# Patient Record
Sex: MALE | Race: WHITE | NOT HISPANIC OR LATINO | URBAN - METROPOLITAN AREA
[De-identification: names, ages, dates, MRNs, and addresses within clinical notes are randomized per-mention and may not be internally consistent; named-entity substitution may affect disease eponyms.]

---

## 2022-09-26 ENCOUNTER — HOSPITAL ENCOUNTER (EMERGENCY)
Facility: HOSPITAL | Age: 77
Discharge: HOME | End: 2022-09-26
Attending: EMERGENCY MEDICINE
Payer: MEDICARE

## 2022-09-26 VITALS
DIASTOLIC BLOOD PRESSURE: 81 MMHG | RESPIRATION RATE: 16 BRPM | WEIGHT: 180 LBS | HEART RATE: 94 BPM | OXYGEN SATURATION: 98 % | TEMPERATURE: 98 F | SYSTOLIC BLOOD PRESSURE: 165 MMHG
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
## 2022-09-26 DIAGNOSIS — S09.90XA CLOSED HEAD INJURY, INITIAL ENCOUNTER: Primary | ICD-10-CM

## 2022-09-26 PROCEDURE — 99284 EMERGENCY DEPT VISIT MOD MDM: CPT | Mod: 25

## 2022-09-27 NOTE — ED NOTES
Pt presents with EMS for fall at restaurant.  +ETOH, no c/o at this time.      Patient identifiers verified by spelling and stated name on armband along with .     Review of patient's allergies indicates:  Review of patient's allergies indicates:  No Known Allergies    APPEARANCE: . No apparent distress or deformities noted.  NEURO: A&Ox4, GCS-15.     CARDIAC: Denies CP.   RESPIRATORY:Respirations are even and unlabored with regular rate and effort, No use of accessory muscles, denies SOB.   MUSC: Full ROM. No bony tenderness or soft tissue tenderness. No obvious deformity.  SKIN: abrasion to back of scalp    Patient updated on plan of care and changed into hospital gown.  Bed locked and in low position with side rails up x2, call light within reach, family at bedside.     Will continue to monitor.
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
## 2022-12-27 ENCOUNTER — HOSPITAL ENCOUNTER (INPATIENT)
Facility: HOSPITAL | Age: 77
LOS: 8 days | Discharge: PSYCHIATRIC OR SUBSTANCE USE FACILITY | DRG: 056 | End: 2023-01-04
Attending: EMERGENCY MEDICINE | Admitting: INTERNAL MEDICINE
Payer: MEDICARE

## 2022-12-27 DIAGNOSIS — R79.89 ELEVATED TROPONIN: ICD-10-CM

## 2022-12-27 DIAGNOSIS — F03.90 DEMENTIA, UNSPECIFIED DEMENTIA SEVERITY, UNSPECIFIED DEMENTIA TYPE, UNSPECIFIED WHETHER BEHAVIORAL, PSYCHOTIC, OR MOOD DISTURBANCE OR ANXIETY: Primary | ICD-10-CM

## 2022-12-27 DIAGNOSIS — N40.1 BENIGN PROSTATIC HYPERPLASIA WITH LOWER URINARY TRACT SYMPTOMS, SYMPTOM DETAILS UNSPECIFIED: ICD-10-CM

## 2022-12-27 DIAGNOSIS — R07.9 CHEST PAIN: ICD-10-CM

## 2022-12-27 DIAGNOSIS — E53.8 B12 DEFICIENCY: ICD-10-CM

## 2022-12-27 DIAGNOSIS — E80.6 HYPERBILIRUBINEMIA: ICD-10-CM

## 2022-12-27 DIAGNOSIS — G93.40 ACUTE ENCEPHALOPATHY: ICD-10-CM

## 2022-12-27 DIAGNOSIS — F01.B11 MODERATE VASCULAR DEMENTIA WITH AGITATION: ICD-10-CM

## 2022-12-27 DIAGNOSIS — R33.9 RETENTION OF URINE: ICD-10-CM

## 2022-12-27 DIAGNOSIS — D64.9 NORMOCYTIC ANEMIA: ICD-10-CM

## 2022-12-27 DIAGNOSIS — R10.11 RUQ PAIN: ICD-10-CM

## 2022-12-27 DIAGNOSIS — I21.4 NSTEMI (NON-ST ELEVATED MYOCARDIAL INFARCTION): ICD-10-CM

## 2022-12-27 DIAGNOSIS — F01.A11 MILD VASCULAR DEMENTIA WITH AGITATION: ICD-10-CM

## 2022-12-27 DIAGNOSIS — R73.9 HYPERGLYCEMIA: ICD-10-CM

## 2022-12-27 DIAGNOSIS — F10.10 ALCOHOL ABUSE: ICD-10-CM

## 2022-12-27 DIAGNOSIS — E78.5 HYPERLIPIDEMIA, UNSPECIFIED HYPERLIPIDEMIA TYPE: ICD-10-CM

## 2022-12-27 PROBLEM — U07.1 COVID-19: Status: ACTIVE | Noted: 2022-12-27

## 2022-12-27 PROBLEM — I10 HYPERTENSION: Status: ACTIVE | Noted: 2022-12-27

## 2022-12-27 PROBLEM — I35.0 AORTIC VALVE STENOSIS: Status: ACTIVE | Noted: 2022-12-27

## 2022-12-27 PROBLEM — E11.9 DIABETES MELLITUS: Status: ACTIVE | Noted: 2022-12-27

## 2022-12-27 PROBLEM — U07.1 COVID-19: Status: RESOLVED | Noted: 2022-12-27 | Resolved: 2022-12-27

## 2022-12-27 PROBLEM — N40.0 BENIGN PROSTATIC HYPERPLASIA: Status: ACTIVE | Noted: 2022-12-27

## 2022-12-27 LAB
ALBUMIN SERPL BCP-MCNC: 3.8 G/DL (ref 3.5–5.2)
ALBUMIN SERPL BCP-MCNC: 3.8 G/DL (ref 3.5–5.2)
ALP SERPL-CCNC: 47 U/L (ref 55–135)
ALP SERPL-CCNC: 54 U/L (ref 55–135)
ALT SERPL W/O P-5'-P-CCNC: 15 U/L (ref 10–44)
ALT SERPL W/O P-5'-P-CCNC: 15 U/L (ref 10–44)
AMMONIA PLAS-SCNC: 14 UMOL/L (ref 10–50)
AMMONIA PLAS-SCNC: 22 UMOL/L (ref 10–50)
ANION GAP SERPL CALC-SCNC: 11 MMOL/L (ref 8–16)
ANION GAP SERPL CALC-SCNC: 9 MMOL/L (ref 8–16)
AST SERPL-CCNC: 15 U/L (ref 10–40)
AST SERPL-CCNC: 17 U/L (ref 10–40)
BACTERIA #/AREA URNS HPF: ABNORMAL /HPF
BASOPHILS # BLD AUTO: 0.05 K/UL (ref 0–0.2)
BASOPHILS # BLD AUTO: 0.06 K/UL (ref 0–0.2)
BASOPHILS NFR BLD: 0.6 % (ref 0–1.9)
BASOPHILS NFR BLD: 1 % (ref 0–1.9)
BILIRUB SERPL-MCNC: 1 MG/DL (ref 0.1–1)
BILIRUB SERPL-MCNC: 1.1 MG/DL (ref 0.1–1)
BILIRUB UR QL STRIP: NEGATIVE
BUN SERPL-MCNC: 16 MG/DL (ref 8–23)
BUN SERPL-MCNC: 21 MG/DL (ref 8–23)
CALCIUM SERPL-MCNC: 9 MG/DL (ref 8.7–10.5)
CALCIUM SERPL-MCNC: 9.2 MG/DL (ref 8.7–10.5)
CHLORIDE SERPL-SCNC: 103 MMOL/L (ref 95–110)
CHLORIDE SERPL-SCNC: 106 MMOL/L (ref 95–110)
CLARITY UR: ABNORMAL
CO2 SERPL-SCNC: 23 MMOL/L (ref 23–29)
CO2 SERPL-SCNC: 24 MMOL/L (ref 23–29)
COLOR UR: YELLOW
CREAT SERPL-MCNC: 1 MG/DL (ref 0.5–1.4)
CREAT SERPL-MCNC: 1.2 MG/DL (ref 0.5–1.4)
DIFFERENTIAL METHOD: ABNORMAL
DIFFERENTIAL METHOD: ABNORMAL
EOSINOPHIL # BLD AUTO: 0.1 K/UL (ref 0–0.5)
EOSINOPHIL # BLD AUTO: 0.1 K/UL (ref 0–0.5)
EOSINOPHIL NFR BLD: 1 % (ref 0–8)
EOSINOPHIL NFR BLD: 1.5 % (ref 0–8)
ERYTHROCYTE [DISTWIDTH] IN BLOOD BY AUTOMATED COUNT: 13.2 % (ref 11.5–14.5)
ERYTHROCYTE [DISTWIDTH] IN BLOOD BY AUTOMATED COUNT: 13.2 % (ref 11.5–14.5)
EST. GFR  (NO RACE VARIABLE): >60 ML/MIN/1.73 M^2
EST. GFR  (NO RACE VARIABLE): >60 ML/MIN/1.73 M^2
ESTIMATED AVG GLUCOSE: 137 MG/DL (ref 68–131)
GLUCOSE SERPL-MCNC: 124 MG/DL (ref 70–110)
GLUCOSE SERPL-MCNC: 188 MG/DL (ref 70–110)
GLUCOSE UR QL STRIP: ABNORMAL
HBA1C MFR BLD: 6.4 % (ref 4–5.6)
HCT VFR BLD AUTO: 35.5 % (ref 40–54)
HCT VFR BLD AUTO: 36.5 % (ref 40–54)
HGB BLD-MCNC: 11.9 G/DL (ref 14–18)
HGB BLD-MCNC: 12.5 G/DL (ref 14–18)
HGB UR QL STRIP: ABNORMAL
HYALINE CASTS #/AREA URNS LPF: 1 /LPF
IMM GRANULOCYTES # BLD AUTO: 0.02 K/UL (ref 0–0.04)
IMM GRANULOCYTES # BLD AUTO: 0.02 K/UL (ref 0–0.04)
IMM GRANULOCYTES NFR BLD AUTO: 0.2 % (ref 0–0.5)
IMM GRANULOCYTES NFR BLD AUTO: 0.3 % (ref 0–0.5)
KETONES UR QL STRIP: NEGATIVE
LEUKOCYTE ESTERASE UR QL STRIP: ABNORMAL
LYMPHOCYTES # BLD AUTO: 1.3 K/UL (ref 1–4.8)
LYMPHOCYTES # BLD AUTO: 2.3 K/UL (ref 1–4.8)
LYMPHOCYTES NFR BLD: 20.9 % (ref 18–48)
LYMPHOCYTES NFR BLD: 27.6 % (ref 18–48)
MAGNESIUM SERPL-MCNC: 2.2 MG/DL (ref 1.6–2.6)
MCH RBC QN AUTO: 30.5 PG (ref 27–31)
MCH RBC QN AUTO: 30.7 PG (ref 27–31)
MCHC RBC AUTO-ENTMCNC: 33.5 G/DL (ref 32–36)
MCHC RBC AUTO-ENTMCNC: 34.2 G/DL (ref 32–36)
MCV RBC AUTO: 89 FL (ref 82–98)
MCV RBC AUTO: 92 FL (ref 82–98)
MICROSCOPIC COMMENT: ABNORMAL
MONOCYTES # BLD AUTO: 0.7 K/UL (ref 0.3–1)
MONOCYTES # BLD AUTO: 0.8 K/UL (ref 0.3–1)
MONOCYTES NFR BLD: 10 % (ref 4–15)
MONOCYTES NFR BLD: 11.3 % (ref 4–15)
NEUTROPHILS # BLD AUTO: 4 K/UL (ref 1.8–7.7)
NEUTROPHILS # BLD AUTO: 4.9 K/UL (ref 1.8–7.7)
NEUTROPHILS NFR BLD: 60.1 % (ref 38–73)
NEUTROPHILS NFR BLD: 65.5 % (ref 38–73)
NITRITE UR QL STRIP: NEGATIVE
NON-SQ EPI CELLS #/AREA URNS HPF: 2 /HPF
NRBC BLD-RTO: 0 /100 WBC
NRBC BLD-RTO: 0 /100 WBC
PH UR STRIP: 6 [PH] (ref 5–8)
PHOSPHATE SERPL-MCNC: 2.9 MG/DL (ref 2.7–4.5)
PLATELET # BLD AUTO: 158 K/UL (ref 150–450)
PLATELET # BLD AUTO: 163 K/UL (ref 150–450)
PMV BLD AUTO: 12.2 FL (ref 9.2–12.9)
PMV BLD AUTO: 12.5 FL (ref 9.2–12.9)
POTASSIUM SERPL-SCNC: 3.8 MMOL/L (ref 3.5–5.1)
POTASSIUM SERPL-SCNC: 4.2 MMOL/L (ref 3.5–5.1)
PROT SERPL-MCNC: 6.5 G/DL (ref 6–8.4)
PROT SERPL-MCNC: 6.9 G/DL (ref 6–8.4)
PROT UR QL STRIP: ABNORMAL
RBC # BLD AUTO: 3.87 M/UL (ref 4.6–6.2)
RBC # BLD AUTO: 4.1 M/UL (ref 4.6–6.2)
RBC #/AREA URNS HPF: 5 /HPF (ref 0–4)
SODIUM SERPL-SCNC: 136 MMOL/L (ref 136–145)
SODIUM SERPL-SCNC: 140 MMOL/L (ref 136–145)
SP GR UR STRIP: 1.01 (ref 1–1.03)
SQUAMOUS #/AREA URNS HPF: 16 /HPF
TROPONIN I SERPL DL<=0.01 NG/ML-MCNC: 0.16 NG/ML (ref 0–0.03)
TROPONIN I SERPL DL<=0.01 NG/ML-MCNC: 0.2 NG/ML (ref 0–0.03)
TSH SERPL DL<=0.005 MIU/L-ACNC: 3.18 UIU/ML (ref 0.4–4)
UNIDENT CRYS URNS QL MICRO: 2
URN SPEC COLLECT METH UR: ABNORMAL
UROBILINOGEN UR STRIP-ACNC: NEGATIVE EU/DL
WBC # BLD AUTO: 6.09 K/UL (ref 3.9–12.7)
WBC # BLD AUTO: 8.18 K/UL (ref 3.9–12.7)
WBC #/AREA URNS HPF: 73 /HPF (ref 0–5)
YEAST URNS QL MICRO: ABNORMAL

## 2022-12-27 PROCEDURE — 84443 ASSAY THYROID STIM HORMONE: CPT | Performed by: EMERGENCY MEDICINE

## 2022-12-27 PROCEDURE — 25000003 PHARM REV CODE 250: Performed by: STUDENT IN AN ORGANIZED HEALTH CARE EDUCATION/TRAINING PROGRAM

## 2022-12-27 PROCEDURE — 82140 ASSAY OF AMMONIA: CPT | Performed by: EMERGENCY MEDICINE

## 2022-12-27 PROCEDURE — 93010 ELECTROCARDIOGRAM REPORT: CPT | Mod: ,,, | Performed by: INTERNAL MEDICINE

## 2022-12-27 PROCEDURE — 83036 HEMOGLOBIN GLYCOSYLATED A1C: CPT | Performed by: STUDENT IN AN ORGANIZED HEALTH CARE EDUCATION/TRAINING PROGRAM

## 2022-12-27 PROCEDURE — 82140 ASSAY OF AMMONIA: CPT | Mod: 91 | Performed by: STUDENT IN AN ORGANIZED HEALTH CARE EDUCATION/TRAINING PROGRAM

## 2022-12-27 PROCEDURE — 87389 HIV-1 AG W/HIV-1&-2 AB AG IA: CPT | Performed by: STUDENT IN AN ORGANIZED HEALTH CARE EDUCATION/TRAINING PROGRAM

## 2022-12-27 PROCEDURE — 84484 ASSAY OF TROPONIN QUANT: CPT | Performed by: EMERGENCY MEDICINE

## 2022-12-27 PROCEDURE — 93010 ELECTROCARDIOGRAM REPORT: CPT | Mod: 76,,, | Performed by: INTERNAL MEDICINE

## 2022-12-27 PROCEDURE — 84425 ASSAY OF VITAMIN B-1: CPT | Performed by: STUDENT IN AN ORGANIZED HEALTH CARE EDUCATION/TRAINING PROGRAM

## 2022-12-27 PROCEDURE — 93005 ELECTROCARDIOGRAM TRACING: CPT

## 2022-12-27 PROCEDURE — 25000003 PHARM REV CODE 250: Performed by: EMERGENCY MEDICINE

## 2022-12-27 PROCEDURE — 85025 COMPLETE CBC W/AUTO DIFF WBC: CPT | Performed by: EMERGENCY MEDICINE

## 2022-12-27 PROCEDURE — S0166 INJ OLANZAPINE 2.5MG: HCPCS | Performed by: STUDENT IN AN ORGANIZED HEALTH CARE EDUCATION/TRAINING PROGRAM

## 2022-12-27 PROCEDURE — 83735 ASSAY OF MAGNESIUM: CPT | Performed by: STUDENT IN AN ORGANIZED HEALTH CARE EDUCATION/TRAINING PROGRAM

## 2022-12-27 PROCEDURE — 82607 VITAMIN B-12: CPT | Performed by: STUDENT IN AN ORGANIZED HEALTH CARE EDUCATION/TRAINING PROGRAM

## 2022-12-27 PROCEDURE — 84484 ASSAY OF TROPONIN QUANT: CPT | Mod: 91 | Performed by: STUDENT IN AN ORGANIZED HEALTH CARE EDUCATION/TRAINING PROGRAM

## 2022-12-27 PROCEDURE — 85025 COMPLETE CBC W/AUTO DIFF WBC: CPT | Mod: 91 | Performed by: STUDENT IN AN ORGANIZED HEALTH CARE EDUCATION/TRAINING PROGRAM

## 2022-12-27 PROCEDURE — 87086 URINE CULTURE/COLONY COUNT: CPT | Performed by: EMERGENCY MEDICINE

## 2022-12-27 PROCEDURE — 12000002 HC ACUTE/MED SURGE SEMI-PRIVATE ROOM

## 2022-12-27 PROCEDURE — 86803 HEPATITIS C AB TEST: CPT | Performed by: STUDENT IN AN ORGANIZED HEALTH CARE EDUCATION/TRAINING PROGRAM

## 2022-12-27 PROCEDURE — 82746 ASSAY OF FOLIC ACID SERUM: CPT | Performed by: STUDENT IN AN ORGANIZED HEALTH CARE EDUCATION/TRAINING PROGRAM

## 2022-12-27 PROCEDURE — 80053 COMPREHEN METABOLIC PANEL: CPT | Mod: 91 | Performed by: STUDENT IN AN ORGANIZED HEALTH CARE EDUCATION/TRAINING PROGRAM

## 2022-12-27 PROCEDURE — 80053 COMPREHEN METABOLIC PANEL: CPT | Performed by: EMERGENCY MEDICINE

## 2022-12-27 PROCEDURE — 84100 ASSAY OF PHOSPHORUS: CPT | Performed by: STUDENT IN AN ORGANIZED HEALTH CARE EDUCATION/TRAINING PROGRAM

## 2022-12-27 PROCEDURE — 99285 EMERGENCY DEPT VISIT HI MDM: CPT | Mod: 25

## 2022-12-27 PROCEDURE — 81000 URINALYSIS NONAUTO W/SCOPE: CPT | Performed by: EMERGENCY MEDICINE

## 2022-12-27 PROCEDURE — 93010 EKG 12-LEAD: ICD-10-PCS | Mod: 76,,, | Performed by: INTERNAL MEDICINE

## 2022-12-27 RX ORDER — DOXYCYCLINE 100 MG/1
100 CAPSULE ORAL 2 TIMES DAILY
COMMUNITY
End: 2022-12-27

## 2022-12-27 RX ORDER — QUETIAPINE FUMARATE 25 MG/1
50 TABLET, FILM COATED ORAL
Status: COMPLETED | OUTPATIENT
Start: 2022-12-27 | End: 2022-12-27

## 2022-12-27 RX ORDER — INSULIN ASPART 100 [IU]/ML
0-5 INJECTION, SOLUTION INTRAVENOUS; SUBCUTANEOUS
Status: DISCONTINUED | OUTPATIENT
Start: 2022-12-27 | End: 2023-01-04 | Stop reason: HOSPADM

## 2022-12-27 RX ORDER — IBUPROFEN 200 MG
24 TABLET ORAL
Status: DISCONTINUED | OUTPATIENT
Start: 2022-12-27 | End: 2023-01-04 | Stop reason: HOSPADM

## 2022-12-27 RX ORDER — IBUPROFEN 200 MG
16 TABLET ORAL
Status: DISCONTINUED | OUTPATIENT
Start: 2022-12-27 | End: 2023-01-04 | Stop reason: HOSPADM

## 2022-12-27 RX ORDER — SAXAGLIPTIN 5 MG/1
5 TABLET, FILM COATED ORAL
COMMUNITY
End: 2022-12-27

## 2022-12-27 RX ORDER — FINASTERIDE 5 MG/1
5 TABLET, FILM COATED ORAL
COMMUNITY
End: 2022-12-27

## 2022-12-27 RX ORDER — AMLODIPINE BESYLATE 5 MG/1
10 TABLET ORAL DAILY
Status: DISCONTINUED | OUTPATIENT
Start: 2022-12-28 | End: 2023-01-04 | Stop reason: HOSPADM

## 2022-12-27 RX ORDER — THIAMINE HCL 100 MG
100 TABLET ORAL DAILY
Status: DISCONTINUED | OUTPATIENT
Start: 2022-12-28 | End: 2022-12-29

## 2022-12-27 RX ORDER — METHYLPREDNISOLONE 4 MG/1
TABLET ORAL
COMMUNITY
Start: 2022-08-22 | End: 2022-12-27

## 2022-12-27 RX ORDER — HYDROCHLOROTHIAZIDE 12.5 MG/1
12.5 TABLET ORAL DAILY
Status: DISCONTINUED | OUTPATIENT
Start: 2022-12-28 | End: 2023-01-04 | Stop reason: HOSPADM

## 2022-12-27 RX ORDER — AMLODIPINE BESYLATE 10 MG/1
10 TABLET ORAL
COMMUNITY
End: 2022-12-27

## 2022-12-27 RX ORDER — LORATADINE 10 MG/1
10 TABLET ORAL DAILY PRN
COMMUNITY
Start: 2022-08-22

## 2022-12-27 RX ORDER — FOLIC ACID 1 MG/1
1 TABLET ORAL DAILY
Status: DISCONTINUED | OUTPATIENT
Start: 2022-12-28 | End: 2023-01-04 | Stop reason: HOSPADM

## 2022-12-27 RX ORDER — OLANZAPINE 10 MG/2ML
5 INJECTION, POWDER, FOR SOLUTION INTRAMUSCULAR ONCE
Status: COMPLETED | OUTPATIENT
Start: 2022-12-27 | End: 2022-12-27

## 2022-12-27 RX ORDER — GLUCAGON 1 MG
1 KIT INJECTION
Status: DISCONTINUED | OUTPATIENT
Start: 2022-12-27 | End: 2023-01-04 | Stop reason: HOSPADM

## 2022-12-27 RX ORDER — LABETALOL HYDROCHLORIDE 5 MG/ML
10 INJECTION, SOLUTION INTRAVENOUS
Status: DISCONTINUED | OUTPATIENT
Start: 2022-12-27 | End: 2023-01-04 | Stop reason: HOSPADM

## 2022-12-27 RX ORDER — LABETALOL HYDROCHLORIDE 5 MG/ML
10 INJECTION, SOLUTION INTRAVENOUS EVERY 10 MIN PRN
Status: DISCONTINUED | OUTPATIENT
Start: 2022-12-27 | End: 2022-12-27

## 2022-12-27 RX ORDER — ASPIRIN 325 MG
325 TABLET ORAL
Status: COMPLETED | OUTPATIENT
Start: 2022-12-27 | End: 2022-12-27

## 2022-12-27 RX ADMIN — ASPIRIN 325 MG ORAL TABLET 325 MG: 325 PILL ORAL at 04:12

## 2022-12-27 RX ADMIN — OLANZAPINE 5 MG: 10 INJECTION, POWDER, LYOPHILIZED, FOR SOLUTION INTRAMUSCULAR at 09:12

## 2022-12-27 RX ADMIN — LABETALOL HYDROCHLORIDE 10 MG: 5 INJECTION INTRAVENOUS at 08:12

## 2022-12-27 RX ADMIN — QUETIAPINE FUMARATE 50 MG: 25 TABLET ORAL at 04:12
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
## 2022-12-27 NOTE — ED NOTES
MD at bedside for assessment.  Patient has had progressive worsening of confusion with hx of dementia. MD plan of care is for head CT and re-evaluated after results. MD will speak with daughter to evaluate patient safety at home

## 2022-12-27 NOTE — Clinical Note
Diagnosis: NSTEMI (non-ST elevated myocardial infarction) [155084]   Admitting Provider:: KEENAN MAGANA [[id number]]   Future Attending Provider: KEENAN MAGANA [[id number]]   Reason for IP Medical Treatment  (Clinical interventions that can only be accomplished in the IP setting? ) :: cards, nh placement   Estimated Length of Stay:: 2 midnights   I certify that Inpatient services for greater than or equal to 2 midnights are medically necessary:: Yes   Plans for Post-Acute care--if anticipated (pick the single best option):: I. Nursing Home ([facility])

## 2022-12-27 NOTE — PROGRESS NOTES
[facility]           Pharmacy        Current Drug Shortage     Due to national backorder and [facility] is critically low on inventory of Dextrose 50% (D50) Syringes and Vials, pharmacy has automatically switched from D50% to D10% IVPB at the equivalent dose until resolution of the shortage per P&T approved protocol.               Jonathan Eckert PharmD, [facility]  [phone]
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
## 2022-12-27 NOTE — ED NOTES
[place] applied to patient and IV access resecured with coban. Patient redirected from pulling on access

## 2022-12-28 LAB
CHOLEST SERPL-MCNC: 219 MG/DL (ref 120–199)
CHOLEST/HDLC SERPL: 2.8 {RATIO} (ref 2–5)
FOLATE SERPL-MCNC: 7.6 NG/ML (ref 4–24)
HCV AB SERPL QL IA: NORMAL
HDLC SERPL-MCNC: 78 MG/DL (ref 40–75)
HDLC SERPL: 35.6 % (ref 20–50)
HIV 1+2 AB+HIV1 P24 AG SERPL QL IA: NORMAL
LDLC SERPL CALC-MCNC: 123.2 MG/DL (ref 63–159)
NONHDLC SERPL-MCNC: 141 MG/DL
POCT GLUCOSE: 148 MG/DL (ref 70–110)
POCT GLUCOSE: 152 MG/DL (ref 70–110)
POCT GLUCOSE: 152 MG/DL (ref 70–110)
POCT GLUCOSE: 165 MG/DL (ref 70–110)
TRIGL SERPL-MCNC: 89 MG/DL (ref 30–150)
TROPONIN I SERPL DL<=0.01 NG/ML-MCNC: 0.11 NG/ML (ref 0–0.03)
TROPONIN I SERPL DL<=0.01 NG/ML-MCNC: 0.14 NG/ML (ref 0–0.03)
TROPONIN I SERPL DL<=0.01 NG/ML-MCNC: 0.22 NG/ML (ref 0–0.03)
VIT B12 SERPL-MCNC: 195 PG/ML (ref 210–950)

## 2022-12-28 PROCEDURE — 11000001 HC ACUTE MED/SURG PRIVATE ROOM

## 2022-12-28 PROCEDURE — 97161 PT EVAL LOW COMPLEX 20 MIN: CPT

## 2022-12-28 PROCEDURE — 80061 LIPID PANEL: CPT

## 2022-12-28 PROCEDURE — S0166 INJ OLANZAPINE 2.5MG: HCPCS

## 2022-12-28 PROCEDURE — 25000003 PHARM REV CODE 250

## 2022-12-28 PROCEDURE — 84484 ASSAY OF TROPONIN QUANT: CPT | Mod: 91 | Performed by: STUDENT IN AN ORGANIZED HEALTH CARE EDUCATION/TRAINING PROGRAM

## 2022-12-28 PROCEDURE — 25000003 PHARM REV CODE 250: Performed by: STUDENT IN AN ORGANIZED HEALTH CARE EDUCATION/TRAINING PROGRAM

## 2022-12-28 PROCEDURE — 63600175 PHARM REV CODE 636 W HCPCS: Performed by: STUDENT IN AN ORGANIZED HEALTH CARE EDUCATION/TRAINING PROGRAM

## 2022-12-28 PROCEDURE — 36415 COLL VENOUS BLD VENIPUNCTURE: CPT | Performed by: STUDENT IN AN ORGANIZED HEALTH CARE EDUCATION/TRAINING PROGRAM

## 2022-12-28 PROCEDURE — 97530 THERAPEUTIC ACTIVITIES: CPT

## 2022-12-28 RX ORDER — QUETIAPINE FUMARATE 25 MG/1
50 TABLET, FILM COATED ORAL ONCE
Status: COMPLETED | OUTPATIENT
Start: 2022-12-28 | End: 2022-12-28

## 2022-12-28 RX ORDER — ATORVASTATIN CALCIUM 40 MG/1
40 TABLET, FILM COATED ORAL DAILY
Status: DISCONTINUED | OUTPATIENT
Start: 2022-12-29 | End: 2023-01-04 | Stop reason: HOSPADM

## 2022-12-28 RX ORDER — OLANZAPINE 10 MG/2ML
5 INJECTION, POWDER, FOR SOLUTION INTRAMUSCULAR ONCE AS NEEDED
Status: COMPLETED | OUTPATIENT
Start: 2022-12-28 | End: 2022-12-28

## 2022-12-28 RX ORDER — ENOXAPARIN SODIUM 100 MG/ML
40 INJECTION SUBCUTANEOUS EVERY 24 HOURS
Status: DISCONTINUED | OUTPATIENT
Start: 2022-12-28 | End: 2023-01-04 | Stop reason: HOSPADM

## 2022-12-28 RX ORDER — DROPERIDOL 2.5 MG/ML
0.62 INJECTION, SOLUTION INTRAMUSCULAR; INTRAVENOUS ONCE
Status: COMPLETED | OUTPATIENT
Start: 2022-12-28 | End: 2022-12-28

## 2022-12-28 RX ORDER — CYANOCOBALAMIN 1000 UG/ML
1000 INJECTION, SOLUTION INTRAMUSCULAR; SUBCUTANEOUS WEEKLY
Status: DISCONTINUED | OUTPATIENT
Start: 2022-12-28 | End: 2023-01-04 | Stop reason: HOSPADM

## 2022-12-28 RX ADMIN — CYANOCOBALAMIN 1000 MCG: 1000 INJECTION, SOLUTION INTRAMUSCULAR; SUBCUTANEOUS at 08:12

## 2022-12-28 RX ADMIN — THIAMINE HCL TAB 100 MG 100 MG: 100 TAB at 08:12

## 2022-12-28 RX ADMIN — FOLIC ACID 1 MG: 1 TABLET ORAL at 08:12

## 2022-12-28 RX ADMIN — QUETIAPINE FUMARATE 50 MG: 25 TABLET ORAL at 10:12

## 2022-12-28 RX ADMIN — DROPERIDOL 0.62 MG: 2.5 INJECTION, SOLUTION INTRAMUSCULAR; INTRAVENOUS at 05:12

## 2022-12-28 RX ADMIN — AMLODIPINE BESYLATE 10 MG: 5 TABLET ORAL at 08:12

## 2022-12-28 RX ADMIN — OLANZAPINE 5 MG: 10 INJECTION, POWDER, FOR SOLUTION INTRAMUSCULAR at 12:12

## 2022-12-28 RX ADMIN — THERA TABS 1 TABLET: TAB at 08:12

## 2022-12-28 RX ADMIN — LABETALOL HYDROCHLORIDE 10 MG: 5 INJECTION INTRAVENOUS at 05:12

## 2022-12-28 RX ADMIN — ENOXAPARIN SODIUM 40 MG: 40 INJECTION SUBCUTANEOUS at 04:12

## 2022-12-28 RX ADMIN — HYDROCHLOROTHIAZIDE 12.5 MG: 12.5 TABLET ORAL at 08:12

## 2022-12-28 NOTE — PLAN OF CARE
Patient non-verbal. VN obtained information from past medical records and ER records to complete admission assessment.  Will continue to monitor and intervene as needed.         12/28/22 0516   Admission   Initial VN Admission Questions Complete

## 2022-12-28 NOTE — PLAN OF CARE
SW received call from pts daughter. Pts daughter reported that she is driving down to [place] today from [place]. Pts daughter reported that she will go to pts  office to get as much information as possible. RAJESH will meet with pts daughter tomorrow at hospital. Pts daughter has been made aware that NH referrals will be sent. Pts daughter also made aware of barriers for placement due to pt dementia and behaviors.     Leona Oneal (Daughter)   [phone]     RAJESH sent NH referrals via [employer].      12/28/22 1525   Post-Acute Status   Post-Acute Authorization Other
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
## 2022-12-28 NOTE — PLAN OF CARE
RAJESH contacted pts daughter Leona Oneal   [phone] to discuss dc planning. Pts daughter reported strained relationship with her father. Pts daughter reports that her dad visited her in [place] for [place] as she has not seen him in a long time. Pts daughter reported that pt must be seen by a doctor once she recently saw him. Pts daughter reports that when she saw pt he was not oriented to place or time.  Pts daughter expressed that pt lives in [place] alone. Pts daughter reported that does not have any dme at home. SW provided pts daughter with contact information for any questions or concerns. Pts daughter will contact pts girlfriend to see any informative information she can get from her. Pts daughter is interested in NH placement for pt. SW will continue to follow pt throughout his transitions of care and assist with any dc needs.        12/28/22 0826   Discharge Assessment   Assessment Type Discharge Planning Assessment   Confirmed/corrected address, phone number and insurance Yes   Confirmed Demographics Correct on Facesheet   Source of Information family   Communicated AKBAR with patient/caregiver Yes   Reason For Admission Mild vascular dementia with agitation   People in Home alone   Do you expect to return to your current living situation? Yes   Do you have help at home or someone to help you manage your care at home? Yes   Prior to hospitilization cognitive status: Unable to Assess   Current cognitive status: Unable to Assess   Equipment Currently Used at Home none   Readmission within 30 days? No   Patient currently being followed by outpatient case management? No   Are you on dialysis? No   Do you take coumadin? No   Discharge Plan A Home   DME Needed Upon Discharge    (TBD)   Discharge Plan discussed with: Adult children   Discharge Barriers Identified None

## 2022-12-28 NOTE — PT/OT/SLP PROGRESS
Occupational Therapy      Patient Name:  Raymundo Oneal   MRN:  [id number]    Attempts made 1:45 PM and 4:03 PM Patient not seen today secondary to Nurse/ JOSE hold as pt confused and agitated, in restraints upon 2nd attempt. Will follow-up as able.    12/28/2022
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
## 2022-12-28 NOTE — PHARMACY MED REC
"Admission Medication History     The home medication history was taken by Debra Rico CPhT.    Medication history obtained from, [facility]     You may go to "Admission" then "Reconcile Home Medications" tabs to review and/or act upon these items.     The home medication list has been updated by the Pharmacy department.   Please read ALL comments highlighted in yellow.   Please address this information as you see fit.    Feel free to contact us if you have any questions or require assistance.      The medications listed below were removed from the home medication list.  Please reorder if appropriate:  Patient reports no longer taking the following medication(s):  Amlodipine 10 mg  Doxycycline 100 mg  Finasteride 5 mg  Medrol Dosepack 4 mg  Onglyza 5 mg        Debra Rico CPhT.  Ext [phone]               .        "

## 2022-12-28 NOTE — PROGRESS NOTES
"[facility] Hospital Medicine Progress Note    Primary Team: [facility] Hospitalist Team B  Attending Physician: Marcelino Huff MD  Resident: Ridge  Intern: Thai    Subjective:      Interval: Pt was seen at bedside after chart review laying comfortably in bed. He is still confused and is inquiring as to when he will be discharged. Pt's daughter is supposed to be coming today.     Objective:     Last 24 Hour Vital Signs:  BP  Min: 130/70  Max: 222/103  Temp  Av.8 °F (36 °C)  Min: 96.3 °F (35.7 °C)  Max: 97.3 °F (36.3 °C)  Pulse  Av.3  Min: 69  Max: 103  Resp  Av.8  Min: 13  Max: 25  SpO2  Av.2 %  Min: 95 %  Max: 99 %  Height  Av' 8" (172.7 cm)  Min: 5' 8" (172.7 cm)  Max: 5' 8" (172.7 cm)  Weight  Av.4 kg (159 lb 9.4 oz)  Min: 72.2 kg (159 lb 2.8 oz)  Max: 72.6 kg (160 lb)  I/O last 3 completed shifts:  In: -   Out: 800 [Urine:800]    Physical Examination:  General: WNWD adult male laying in bed in NAD.  HEENT: NC/AT, PERRLA, EOMI, MMM.  Neck: Supple, trachea midline.  CV: RRR, 2/6 JEAN, normal S1 and S2, no TTP of chest wall.  Resp: CTAB, no wheezing or rhonchi appreciated on auscultation, no respiratory distress.  Abdominal: Soft, NT/ND, normoactive BSx4.  Extremities: 2+ pulses throughout, ROM grossly intact, no clubbing or cyanosis appreciated.  Skin: Warm, dry, intact, no rash or erythema observed on exposed skin.  Neuro: AAO to person but not place, date, or situation. Spontaneously moving extremities.  Psych: Continued suspicious and delusional thought, no aggression.       Laboratory:  Laboratory Data Reviewed: yes  Pertinent Findings:  Recent Labs   Lab 22  1322 22   WBC 6.09 8.18   HGB 11.9* 12.5*   HCT 35.5* 36.5*    158   MCV 92 89   RDW 13.2 13.2    140   K 4.2 3.8    106   CO2 24 23   BUN 21 16   CREATININE 1.2 1.0   * 124*   PROT 6.5 6.9   ALBUMIN 3.8 3.8   BILITOT 1.0 1.1*   AST 15 17   ALKPHOS 54* 47*   ALT 15 15     Recent Labs   Lab " 12/28/22  0122 12/28/22  0721 12/28/22  1354   TROPONINI 0.217* 0.142* 0.113*         Microbiology Data Reviewed: yes  Pertinent Findings:  Microbiology Results (last 7 days)       Procedure Component Value Units Date/Time    Urine culture [[id number]] Collected: 12/27/22 1337    Order Status: No result Specimen: Urine Updated: 12/27/22 1356              Other Results:  Radiology Data Reviewed: yes  Pertinent Findings:  X-Ray Chest 1 View   Final Result      No acute abnormality.         Electronically signed by: Johnson Cordoba   Date:    12/27/2022   Time:    20:16      CT Head Without Contrast   Final Result      1. No acute intracranial process.   2. Involutional changes with chronic microvascular ischemic changes.         Electronically signed by: Jarad Cruz   Date:    12/27/2022   Time:    15:10            Current Medications:     Infusions:       Scheduled:   amLODIPine  10 mg Oral Daily    cyanocobalamin  1,000 mcg Intramuscular Weekly    folic acid  1 mg Oral Daily    hydroCHLOROthiazide  12.5 mg Oral Daily    multivitamin  1 tablet Oral Daily    thiamine  100 mg Oral Daily        PRN:  dextrose 10%, dextrose 10%, glucagon (human recombinant), glucose, glucose, insulin aspart U-100, labetalol    Antibiotics and Day Number of Therapy:  None    Lines and Day Number of Therapy:  PIV, day 2       Assessment and Plan:     Raymundo Oneal is a 77 y.o. male w/ pmh of DM, HTN, HLD, BPH s/p retropubic prostatectomy, urinary retention w/ hx of b/l hydronephrosis, and aortic stenosis per chart review, despite patient claiming that he has no medical problems, who presented to [facility] on [facility]/27/2022 with a primary complaint of AMS. He was admitted to [facility] for AMS and NSTEMI.        #NSTEMI r/o  Troponin 0.159 in ED w/ initial c/o CP per EMS. Repeat troponin 0.196. HTN likely etiology for troponin elevation.   - Patient denied ever experiencing CP at time of admit.  - Troponin  peaked at 0.217 and trended down to 0.113 when last checked.  - Will consider consulting Cards if c/f ACS increases during this admission.  - Continue to target good BP control.        #AMS  #EtOH Abuse  Patient's girlfriend claims he has been experiencing gradually progressive dementia for several months but experienced significant change in mental status from yesterday. Vascular dementia w/ or w/o some degree of EtOH induced encephalopathy most likely etiology. Patient reports consuming 3-4 beers daily for unknown duration.  - Thiamine level pending, started on Thiamine 100 mg daily.  - Folate 7.6, B12 195. Started on Cyanocobalamin 1,000 mcg weekly.  - Neuro consult pending, appreciate recs.  - Seroquel 50 mg x 2 doses and Zyprexa 5 mg x 2 doses given since admit for agitation.  - Order Droperidol 0.625 mg x 1 dose and 2-point restraints.  - Will contact pt's daughter (info in H&P) to discuss admission.  - Will continue to monitor mental status.        #HTN  /78 in ED. Hx of HTN per chart review w/ previous home med of Amlodipine 5 mg BID.  - /73 today.  - Continue Amlodipine 10 mg daily and HCTZ 12.5 mg daily.  - Continue Labetalol 10 mg q2h PRN for SBP > 220 or DBP > 110.  - Continue to routinely monitor vitals.  - Will refer patient for f/u w/ PCP at time of discharge.        #DM  Hx of DM per chart review w/ previous home med of Saxagliptin. Patient reports that he is not currently taking any home meds. Glucose 188 in ED, 124 on repeat CMP. Hgb A1c 6.4 on 12/27.  - Glucose 152 via Accucheck this morning.  - Continue Insulin 0-5 U PRN before meals and at bedtime.  - Will continue to monitor w/ daily labs and adjust plan as needed.  - Will refer patient for f/u w/ PCP at time of discharge.        #HLD  Hx of HLD per chart review w/ previous statin therapy. Patient reports that he is not currently taking any home meds.  - Lipid panel remarkable for total cholesterol of 219 and HDL of 78.  - Start  Atorvastatin 40 mg daily.  - F/u w/ PCP after discharge.        #BPH  #Hx of Urinary Retention  Hx of BPH and urinary retention s/p cystoscopy w/ bladder neck and prostate fulguration in 05/2016 and retropubic prostatectomy on 10/28/19 per chart review. Previous home med of Tamsulosin 0.4 mg daily.  - Patient denies experiencing any urinary complaints at this time.  - Consider restarting Tamsulosin 0.4 mg daily if patient begins to experience difficulty w/ urination.        #Aortic Stenosis  Hx of aortic stenosis per chart review. 2/6 JEAN appreciated on physical exam.  - Patient denies experiencing any related symptoms at this time.  - Will refer patient for f/u w/ PCP at time of discharge.        Code Status: Full Code  VTE Ppx: Lovenox 40 mg  Diet: Regular Adult Diet     Disposition: Pending improvement in mental status and Neuro consult.      Diallo Andre DO  [facility] Internal Medicine HO-I    [facility] Medicine Hospitalist Pager numbers:   [facility] Hospitalist Medicine Team A (Kingsley/Mariam): [phone]  [facility] Hospitalist Medicine Team B (Samia/Refugio):  [phone]
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
## 2022-12-29 PROBLEM — G93.40 ACUTE ENCEPHALOPATHY: Status: ACTIVE | Noted: 2022-12-29

## 2022-12-29 PROBLEM — F01.B11 MODERATE VASCULAR DEMENTIA WITH AGITATION: Status: ACTIVE | Noted: 2022-12-27

## 2022-12-29 PROBLEM — F10.10 ALCOHOL ABUSE: Status: ACTIVE | Noted: 2022-12-29

## 2022-12-29 LAB
ALBUMIN SERPL BCP-MCNC: 4.2 G/DL (ref 3.5–5.2)
ALP SERPL-CCNC: 56 U/L (ref 55–135)
ALT SERPL W/O P-5'-P-CCNC: 16 U/L (ref 10–44)
ANION GAP SERPL CALC-SCNC: 14 MMOL/L (ref 8–16)
AST SERPL-CCNC: 18 U/L (ref 10–40)
BACTERIA UR CULT: NO GROWTH
BASOPHILS # BLD AUTO: 0.06 K/UL (ref 0–0.2)
BASOPHILS # BLD AUTO: 0.08 K/UL (ref 0–0.2)
BASOPHILS NFR BLD: 0.5 % (ref 0–1.9)
BASOPHILS NFR BLD: 0.7 % (ref 0–1.9)
BILIRUB SERPL-MCNC: 1.7 MG/DL (ref 0.1–1)
BUN SERPL-MCNC: 16 MG/DL (ref 8–23)
CALCIUM SERPL-MCNC: 9.9 MG/DL (ref 8.7–10.5)
CHLORIDE SERPL-SCNC: 102 MMOL/L (ref 95–110)
CO2 SERPL-SCNC: 22 MMOL/L (ref 23–29)
CREAT SERPL-MCNC: 1.2 MG/DL (ref 0.5–1.4)
DIFFERENTIAL METHOD: ABNORMAL
DIFFERENTIAL METHOD: ABNORMAL
EOSINOPHIL # BLD AUTO: 0 K/UL (ref 0–0.5)
EOSINOPHIL # BLD AUTO: 0.1 K/UL (ref 0–0.5)
EOSINOPHIL NFR BLD: 0.4 % (ref 0–8)
EOSINOPHIL NFR BLD: 1.2 % (ref 0–8)
ERYTHROCYTE [DISTWIDTH] IN BLOOD BY AUTOMATED COUNT: 13 % (ref 11.5–14.5)
ERYTHROCYTE [DISTWIDTH] IN BLOOD BY AUTOMATED COUNT: 13.1 % (ref 11.5–14.5)
ERYTHROCYTE [SEDIMENTATION RATE] IN BLOOD BY PHOTOMETRIC METHOD: 26 MM/HR (ref 0–23)
EST. GFR  (NO RACE VARIABLE): >60 ML/MIN/1.73 M^2
FERRITIN SERPL-MCNC: 303 NG/ML (ref 20–300)
GLUCOSE SERPL-MCNC: 167 MG/DL (ref 70–110)
HCT VFR BLD AUTO: 40.7 % (ref 40–54)
HCT VFR BLD AUTO: 42.2 % (ref 40–54)
HGB BLD-MCNC: 14.1 G/DL (ref 14–18)
HGB BLD-MCNC: 14.2 G/DL (ref 14–18)
IMM GRANULOCYTES # BLD AUTO: 0.03 K/UL (ref 0–0.04)
IMM GRANULOCYTES # BLD AUTO: 0.03 K/UL (ref 0–0.04)
IMM GRANULOCYTES NFR BLD AUTO: 0.3 % (ref 0–0.5)
IMM GRANULOCYTES NFR BLD AUTO: 0.3 % (ref 0–0.5)
LYMPHOCYTES # BLD AUTO: 1.3 K/UL (ref 1–4.8)
LYMPHOCYTES # BLD AUTO: 2.4 K/UL (ref 1–4.8)
LYMPHOCYTES NFR BLD: 11.4 % (ref 18–48)
LYMPHOCYTES NFR BLD: 21.9 % (ref 18–48)
MCH RBC QN AUTO: 30.5 PG (ref 27–31)
MCH RBC QN AUTO: 30.9 PG (ref 27–31)
MCHC RBC AUTO-ENTMCNC: 33.6 G/DL (ref 32–36)
MCHC RBC AUTO-ENTMCNC: 34.6 G/DL (ref 32–36)
MCV RBC AUTO: 89 FL (ref 82–98)
MCV RBC AUTO: 91 FL (ref 82–98)
MONOCYTES # BLD AUTO: 1 K/UL (ref 0.3–1)
MONOCYTES # BLD AUTO: 1.2 K/UL (ref 0.3–1)
MONOCYTES NFR BLD: 10.7 % (ref 4–15)
MONOCYTES NFR BLD: 9.4 % (ref 4–15)
NEUTROPHILS # BLD AUTO: 7.1 K/UL (ref 1.8–7.7)
NEUTROPHILS # BLD AUTO: 8.7 K/UL (ref 1.8–7.7)
NEUTROPHILS NFR BLD: 65.2 % (ref 38–73)
NEUTROPHILS NFR BLD: 78 % (ref 38–73)
NRBC BLD-RTO: 0 /100 WBC
NRBC BLD-RTO: 0 /100 WBC
PATH REV BLD -IMP: NORMAL
PATH REV BLD -IMP: NORMAL
PLATELET # BLD AUTO: 197 K/UL (ref 150–450)
PLATELET # BLD AUTO: 198 K/UL (ref 150–450)
PMV BLD AUTO: 12.3 FL (ref 9.2–12.9)
PMV BLD AUTO: 12.8 FL (ref 9.2–12.9)
POCT GLUCOSE: 159 MG/DL (ref 70–110)
POCT GLUCOSE: 173 MG/DL (ref 70–110)
POCT GLUCOSE: 189 MG/DL (ref 70–110)
POTASSIUM SERPL-SCNC: 3.7 MMOL/L (ref 3.5–5.1)
PROT SERPL-MCNC: 7.7 G/DL (ref 6–8.4)
RBC # BLD AUTO: 4.56 M/UL (ref 4.6–6.2)
RBC # BLD AUTO: 4.66 M/UL (ref 4.6–6.2)
SODIUM SERPL-SCNC: 138 MMOL/L (ref 136–145)
WBC # BLD AUTO: 10.95 K/UL (ref 3.9–12.7)
WBC # BLD AUTO: 11.1 K/UL (ref 3.9–12.7)

## 2022-12-29 PROCEDURE — 36415 COLL VENOUS BLD VENIPUNCTURE: CPT | Performed by: STUDENT IN AN ORGANIZED HEALTH CARE EDUCATION/TRAINING PROGRAM

## 2022-12-29 PROCEDURE — 94760 N-INVAS EAR/PLS OXIMETRY 1: CPT

## 2022-12-29 PROCEDURE — 87040 BLOOD CULTURE FOR BACTERIA: CPT | Performed by: STUDENT IN AN ORGANIZED HEALTH CARE EDUCATION/TRAINING PROGRAM

## 2022-12-29 PROCEDURE — 92523 SPEECH SOUND LANG COMPREHEN: CPT

## 2022-12-29 PROCEDURE — 86592 SYPHILIS TEST NON-TREP QUAL: CPT | Performed by: STUDENT IN AN ORGANIZED HEALTH CARE EDUCATION/TRAINING PROGRAM

## 2022-12-29 PROCEDURE — 85060 PATHOLOGIST REVIEW: ICD-10-PCS | Mod: ,,, | Performed by: PATHOLOGY

## 2022-12-29 PROCEDURE — 25000003 PHARM REV CODE 250: Performed by: STUDENT IN AN ORGANIZED HEALTH CARE EDUCATION/TRAINING PROGRAM

## 2022-12-29 PROCEDURE — 85652 RBC SED RATE AUTOMATED: CPT | Performed by: STUDENT IN AN ORGANIZED HEALTH CARE EDUCATION/TRAINING PROGRAM

## 2022-12-29 PROCEDURE — 25000003 PHARM REV CODE 250

## 2022-12-29 PROCEDURE — 90833 PSYTX W PT W E/M 30 MIN: CPT | Mod: ,,, | Performed by: PSYCHIATRY & NEUROLOGY

## 2022-12-29 PROCEDURE — 27000221 HC OXYGEN, UP TO 24 HOURS

## 2022-12-29 PROCEDURE — 80053 COMPREHEN METABOLIC PANEL: CPT | Performed by: STUDENT IN AN ORGANIZED HEALTH CARE EDUCATION/TRAINING PROGRAM

## 2022-12-29 PROCEDURE — 85025 COMPLETE CBC W/AUTO DIFF WBC: CPT | Mod: 91 | Performed by: STUDENT IN AN ORGANIZED HEALTH CARE EDUCATION/TRAINING PROGRAM

## 2022-12-29 PROCEDURE — 99223 PR INITIAL HOSPITAL CARE,LEVL III: ICD-10-PCS | Mod: ,,, | Performed by: PSYCHIATRY & NEUROLOGY

## 2022-12-29 PROCEDURE — 11000001 HC ACUTE MED/SURG PRIVATE ROOM

## 2022-12-29 PROCEDURE — 82728 ASSAY OF FERRITIN: CPT | Performed by: STUDENT IN AN ORGANIZED HEALTH CARE EDUCATION/TRAINING PROGRAM

## 2022-12-29 PROCEDURE — 99900035 HC TECH TIME PER 15 MIN (STAT)

## 2022-12-29 PROCEDURE — 63600175 PHARM REV CODE 636 W HCPCS: Performed by: STUDENT IN AN ORGANIZED HEALTH CARE EDUCATION/TRAINING PROGRAM

## 2022-12-29 PROCEDURE — 92610 EVALUATE SWALLOWING FUNCTION: CPT

## 2022-12-29 PROCEDURE — 90833 PR PSYCHOTHERAPY W/PATIENT W/E&M, 30 MIN (ADD ON): ICD-10-PCS | Mod: ,,, | Performed by: PSYCHIATRY & NEUROLOGY

## 2022-12-29 PROCEDURE — 85060 BLOOD SMEAR INTERPRETATION: CPT | Mod: ,,, | Performed by: PATHOLOGY

## 2022-12-29 PROCEDURE — 99223 1ST HOSP IP/OBS HIGH 75: CPT | Mod: ,,, | Performed by: PSYCHIATRY & NEUROLOGY

## 2022-12-29 RX ORDER — DROPERIDOL 2.5 MG/ML
0.62 INJECTION, SOLUTION INTRAMUSCULAR; INTRAVENOUS ONCE
Status: COMPLETED | OUTPATIENT
Start: 2022-12-29 | End: 2022-12-29

## 2022-12-29 RX ORDER — DROPERIDOL 2.5 MG/ML
0.62 INJECTION, SOLUTION INTRAMUSCULAR; INTRAVENOUS ONCE
Status: DISCONTINUED | OUTPATIENT
Start: 2022-12-29 | End: 2022-12-29

## 2022-12-29 RX ORDER — OLANZAPINE 10 MG/2ML
5 INJECTION, POWDER, FOR SOLUTION INTRAMUSCULAR EVERY 8 HOURS PRN
Status: DISCONTINUED | OUTPATIENT
Start: 2022-12-29 | End: 2023-01-01

## 2022-12-29 RX ORDER — DIVALPROEX SODIUM 250 MG/1
250 TABLET, DELAYED RELEASE ORAL EVERY 8 HOURS
Status: DISCONTINUED | OUTPATIENT
Start: 2022-12-29 | End: 2023-01-04 | Stop reason: HOSPADM

## 2022-12-29 RX ADMIN — ATORVASTATIN CALCIUM 40 MG: 40 TABLET, FILM COATED ORAL at 10:12

## 2022-12-29 RX ADMIN — THIAMINE HCL TAB 100 MG 100 MG: 100 TAB at 10:12

## 2022-12-29 RX ADMIN — THIAMINE HYDROCHLORIDE 500 MG: 100 INJECTION, SOLUTION INTRAMUSCULAR; INTRAVENOUS at 09:12

## 2022-12-29 RX ADMIN — ENOXAPARIN SODIUM 40 MG: 40 INJECTION SUBCUTANEOUS at 06:12

## 2022-12-29 RX ADMIN — FOLIC ACID 1 MG: 1 TABLET ORAL at 10:12

## 2022-12-29 RX ADMIN — THERA TABS 1 TABLET: TAB at 10:12

## 2022-12-29 RX ADMIN — DIVALPROEX SODIUM 250 MG: 250 TABLET, DELAYED RELEASE ORAL at 10:12

## 2022-12-29 RX ADMIN — AMLODIPINE BESYLATE 10 MG: 5 TABLET ORAL at 10:12

## 2022-12-29 RX ADMIN — DIVALPROEX SODIUM 250 MG: 250 TABLET, DELAYED RELEASE ORAL at 03:12

## 2022-12-29 RX ADMIN — DROPERIDOL 0.62 MG: 2.5 INJECTION, SOLUTION INTRAMUSCULAR; INTRAVENOUS at 10:12

## 2022-12-29 RX ADMIN — HYDROCHLOROTHIAZIDE 12.5 MG: 12.5 TABLET ORAL at 10:12

## 2022-12-29 RX ADMIN — THIAMINE HYDROCHLORIDE 500 MG: 100 INJECTION, SOLUTION INTRAMUSCULAR; INTRAVENOUS at 03:12

## 2022-12-29 RX ADMIN — DROPERIDOL 0.62 MG: 2.5 INJECTION, SOLUTION INTRAMUSCULAR; INTRAVENOUS at 04:12

## 2022-12-29 NOTE — PLAN OF CARE
Problem: Adult Inpatient Plan of Care  Goal: Plan of Care Review  Outcome: Ongoing, Progressing    Chart check complete. Vitals, orders, labs, and progress notes reviewed. Care plan updated. Will monitor.
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
## 2022-12-29 NOTE — PLAN OF CARE
Mark called the [facility] at [phone] and completed LOCET. Mark faxed PASRR and awaiting 142 from [facility].     Mark expanded nursing home referral to facilities in the [facility]     Mark spoke with Gail [phone] at [facility] and [facility]. Gail stated they do not use careport. Referrals can be faxed to Gail at [phone]. Mark faxed referral to Gail at [phone].

## 2022-12-29 NOTE — PT/OT/SLP PROGRESS
Occupational Therapy      Patient Name:  Raymundo Oneal   MRN:  [id number]    Attempt made [phone] Patient not seen today secondary to increased agitation with attempts to initate OT evaluation. OT spoke with family and family asked to hold therapies for today to reduce possibility of agitating pt until his medication regimen is stabilized as family reports that his medications have been changed. Will follow-up as able.    12/29/2022

## 2022-12-29 NOTE — PLAN OF CARE
RAJESH extended search and sent NH referrals via [employer].          12/29/22 7237   Post-Acute Status   Post-Acute Authorization Placement   Post-Acute Placement Status Referrals Sent

## 2022-12-29 NOTE — PT/OT/SLP PROGRESS
Physical Therapy      Patient Name:  Raymundo Oneal   MRN:  [id number]    1535-  Second attempt with pt now in 2pt restraints and family requesting to allow pt to rest and medications to get managed . Will follow-up next tx date.

## 2022-12-29 NOTE — PROGRESS NOTES
[facility] Medicine Progress Note    Primary Team: [facility] Hospitalist Team B  Attending Physician: Angelika Becerra MD  Resident: Ridge  Intern: Thai    Subjective:      Patient is awake and alert this morning sitting in bed eating breakfast.  He had some agitation yesterday and overnight.  He denies visual or auditory hallucinations.  He denies nausea, vomiting, fever, chills, chest pain, shortness of breath, dysuria, diarrhea, or constipation.     Objective:     Last 24 Hour Vital Signs:  BP  Min: 127/73  Max: 198/94  Temp  Av.4 °F (36.3 °C)  Min: 96.2 °F (35.7 °C)  Max: 98.7 °F (37.1 °C)  Pulse  Av.5  Min: 89  Max: 102  Resp  Av.5  Min: 17  Max: 18  SpO2  Av.7 %  Min: 94 %  Max: 97 %  No intake/output data recorded.    Physical Examination:  General: WNWD adult male laying in bed in NAD.  HEENT: NC/AT, PERRLA, EOMI, MMM.  Neck: Supple, trachea midline.  CV: RRR, 2/6 JEAN, normal S1 and S2, no TTP of chest wall.  Resp: CTAB, no wheezing or rhonchi appreciated on auscultation, no respiratory distress.  Abdominal: Soft, NT/ND, normoactive BSx4.  Extremities: 2+ pulses throughout, ROM grossly intact, no clubbing or cyanosis appreciated. In 2 point soft restraints  Skin: Warm, dry, intact, no rash or erythema observed on exposed skin.  Neuro: AAO to person but not place, date, or situation. Spontaneously moving extremities.  Psych: Continued suspicious and delusional thought, no aggression.       Laboratory:  Laboratory Data Reviewed: yes  Pertinent Findings:  Recent Labs   Lab 22  1322 22  0432   WBC 6.09 8.18 10.95   HGB 11.9* 12.5* 14.2   HCT 35.5* 36.5* 42.2    158 198   MCV 92 89 91   RDW 13.2 13.2 13.1    140 138   K 4.2 3.8 3.7    106 102   CO2 24 23 22*   BUN 21 16 16   CREATININE 1.2 1.0 1.2   * 124* 167*   PROT 6.5 6.9 7.7   ALBUMIN 3.8 3.8 4.2   BILITOT 1.0 1.1* 1.7*   AST 15 17 18   ALKPHOS 54* 47* 56   ALT 15 15 16       Recent  Labs   Lab 12/28/22  0122 12/28/22  0721 12/28/22  1354   TROPONINI 0.217* 0.142* 0.113*           Microbiology Data Reviewed: yes  Pertinent Findings:  Microbiology Results (last 7 days)       Procedure Component Value Units Date/Time    Urine culture [[id number]] Collected: 12/27/22 1337    Order Status: Completed Specimen: Urine Updated: 12/29/22 0143     Urine Culture, Routine No growth    Narrative:      Specimen Source->Urine              Other Results:  Radiology Data Reviewed: yes  Pertinent Findings:  X-Ray Chest 1 View   Final Result      No acute abnormality.         Electronically signed by: Johnson Cordoba   Date:    12/27/2022   Time:    20:16      CT Head Without Contrast   Final Result      1. No acute intracranial process.   2. Involutional changes with chronic microvascular ischemic changes.         Electronically signed by: Jarad Cruz   Date:    12/27/2022   Time:    15:10      MRI Brain W WO Contrast    (Results Pending)         Current Medications:     Infusions:       Scheduled:   amLODIPine  10 mg Oral Daily    atorvastatin  40 mg Oral Daily    cyanocobalamin  1,000 mcg Intramuscular Weekly    enoxaparin  40 mg Subcutaneous Daily    folic acid  1 mg Oral Daily    hydroCHLOROthiazide  12.5 mg Oral Daily    multivitamin  1 tablet Oral Daily    thiamine  100 mg Oral Daily        PRN:  dextrose 10%, dextrose 10%, glucagon (human recombinant), glucose, glucose, insulin aspart U-100, labetalol    Antibiotics and Day Number of Therapy:  None    Lines and Day Number of Therapy:  PIV, day 2       Assessment and Plan:     Raymundo Oneal is a 77 y.o. male w/ pmh of DM, HTN, HLD, BPH s/p retropubic prostatectomy, urinary retention w/ hx of b/l hydronephrosis, and aortic stenosis per chart review, despite patient claiming that he has no medical problems, who presented to [facility] on 12/27/2022 with a primary complaint of AMS. He was admitted to [facility] for AMS and  NSTEMI.        #NSTEMI r/o  Troponin 0.159 in ED w/ initial c/o CP per EMS. Repeat troponin 0.196. HTN likely etiology for troponin elevation.   - Patient denied ever experiencing CP at time of admit.  - Troponin peaked at 0.217 and trended down to 0.113 when last checked.  - Will consider consulting Cards if c/f ACS increases during this admission.  - Continue to target good BP control.        #Acute encephalopathy vs worsening dementia  #EtOH Abuse  - has had worsening mental status decline over the last 2 months, particularly worse day prior to admission per the patient's daughter  -CT head negative for acute abnormality  -labs not suggestive of any acute process thus far  - Thiamine level pending, started on Thiamine 100 mg daily.  - Folate 7.6, B12 195. Started on Cyanocobalamin 1,000 mcg weekly.  - Neuro consulted  -MRI brain w/wo contrast ordered  -psych consulted for delusions         #HTN  /78 in ED. Hx of HTN per chart review w/ previous home med of Amlodipine 5 mg BID.  - /73 today.  - Continue Amlodipine 10 mg daily and HCTZ 12.5 mg daily.  - Continue Labetalol 10 mg q2h PRN for SBP > 220 or DBP > 110.  - Continue to routinely monitor vitals.  - Will refer patient for f/u w/ PCP at time of discharge.        #DM  Hx of DM per chart review w/ previous home med of Saxagliptin. Patient reports that he is not currently taking any home meds. Glucose 188 in ED, 124 on repeat CMP. Hgb A1c 6.4 on 12/27.  - Glucose 152 via Accucheck this morning.  - Continue Insulin 0-5 U PRN before meals and at bedtime.  - Will continue to monitor w/ daily labs and adjust plan as needed.  - Will refer patient for f/u w/ PCP at time of discharge.        #HLD  Hx of HLD per chart review w/ previous statin therapy. Patient reports that he is not currently taking any home meds.  - Lipid panel remarkable for total cholesterol of 219 and HDL of 78.  - Start Atorvastatin 40 mg daily.  - F/u w/ PCP after discharge.         #BPH  #Hx of Urinary Retention  Hx of BPH and urinary retention s/p cystoscopy w/ bladder neck and prostate fulguration in 05/2016 and retropubic prostatectomy on 10/28/19 per chart review. Previous home med of Tamsulosin 0.4 mg daily.  - Patient denies experiencing any urinary complaints at this time.  - Consider restarting Tamsulosin 0.4 mg daily if patient begins to experience difficulty w/ urination.        #Aortic Stenosis  Hx of aortic stenosis per chart review. 2/6 JEAN appreciated on physical exam.  - Patient denies experiencing any related symptoms at this time.  - Will refer patient for f/u w/ PCP at time of discharge.        Code Status: Full Code  VTE Ppx: Lovenox 40 mg  Diet: Regular Adult Diet     Disposition: likely nursing home; pending working of acute encephalopathy vs worsening dementia      Lalit Sabillon MD  [facility]    [facility] Medicine Hospitalist Pager numbers:   [facility] Hospitalist Medicine Team A (Kingsley/Mariam): [phone]  [facility] Hospitalist Medicine Team B (Samia/Refugio):  [phone]
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
## 2022-12-29 NOTE — PLAN OF CARE
Mark received call from Gail [phone] at [facility] and [facility]. Gail expressed that the dementia unit is a woman's unit only. Gail expressed that they do not have group home placement for males at there facility.  MARK expressed understanding. Gosia reported that she will email MARK a list of nh facilities in that area. MARK expressed understanding. MARK will continue to follow pt throughout his transitions of care and assist with any dc needs.        12/29/22 1521   Post-Acute Status   Post-Acute Authorization Placement

## 2022-12-29 NOTE — PLAN OF CARE
I spoke to the patient's primary team today in detail about this patient.  If reversible causes of acute AMS are ruled out and patient remains with behavioral disturbances in dementia (delusions, hallucinations, etc.), then would seek anh-psych admission under a PEC for grave disability for mental health stabilization prior to likely need for senior living nursing home placement.     Fili Romero MD  [facility]   12/29/2022

## 2022-12-29 NOTE — PT/OT/SLP PROGRESS
Physical Therapy      Patient Name:  Raymundo Oneal   MRN:  [id number]    1230 - RN reports pt with increased agitation and confusion and was in 4pt restraints earlier.  Medication given with restraints removed at this time secondary to pt being calmer.  Multiple family members in room.  Will follow up later today.

## 2022-12-29 NOTE — CONSULTS
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 an imminent threat to self/others and not being gravely disabled 2/2 mental illness at this time.       - Begin Depakote 250 mg PO TID (Depacon IV is available if needed) for behavioral disturbances in dementia (attempted to discuss risks/benefits/alt vs no treatment with patient).    -Implement the below DELIRIUM BEHAVIOR MANAGEMENT:  - Minimize use of restraints; if physical restraints necessary, please utilize medical/chemical prns for agitation (can use Zyprexa 2.5 mg to 5 mg PO/IM q8 hours PRN).  - Keep shades open and room lit during day and room dim at night in order to promote healthy circadian rhythms.  - Encourage family at bedside.  - Keep whiteboard in patient's room current with the date and name of the members of patient's team for easy patient self re-orientation.  - Avoid benzodiazepines (unless treating alcohol withdrawal with CIWA-Ar protocol), antihistamines, anticholinergics, hypnotics, and minimize opiates while controlling for pain as these medications may exacerbate delirium.      - With reasonable medical certainty, based on a present state examination, the patient currently DOES NOT appear to have medical decision-making capacity as made evident by his inability to cognitively utilize information provided to him to express a " choice that is stable over time, to understand the relevant information pertaining his diagnoses and recommended treatments, to appreciate the consequences of the decision (risks vs benefits) regarding accepting vs refusing treatment, and to manipulate all of the data in a logical fashion.  Please seek the appropriate surrogate decision maker.      - Patient's most recent labs, imaging, and EKG were reviewed today ; agree with neurology team recs for RPR, MRI Brain, and drug screen     - Begin / Continue supplementing B12, Folate, Thiamine (consider more aggressive IV Thiamine supplementation), and Multi-Vitamin     - Psychotherapy was performed with patient as noted above with a focus on improving re-orientation, behavioral modification in AMS / dementia, insomnia, insight / reality orientation, and alcohol cessation / total sobriety.    - Begin / Continue monitoring for alcohol withdrawal with [facility]-Ar protocol.       - Patient will likely need post-acute care SNF vs IPR vs skilled nursing NH (PT/OT on board).       - Thank you for this consult ; will continue to follow patient while at [facility]         Total time spent with patient and/or managing/coordinating patient's care today (excluding the time spent on psychotherapy): 75 minutes   Time spent on psychotherapy today (as noted above): 20 minutes   Total time for encounter today including psychotherapy: 95 minutes      More than 50% of the time was spent counseling/coordinating care.     Consulting clinician was informed of the encounter and consult note.      STAFF:  Fili oRmero MD  [facility]   12/29/2022

## 2022-12-30 PROBLEM — E53.8 B12 DEFICIENCY: Status: ACTIVE | Noted: 2022-12-30

## 2022-12-30 LAB
ALBUMIN SERPL BCP-MCNC: 3.7 G/DL (ref 3.5–5.2)
ALP SERPL-CCNC: 51 U/L (ref 55–135)
ALT SERPL W/O P-5'-P-CCNC: 10 U/L (ref 10–44)
ANION GAP SERPL CALC-SCNC: 13 MMOL/L (ref 8–16)
AST SERPL-CCNC: 15 U/L (ref 10–40)
BASOPHILS # BLD AUTO: 0.03 K/UL (ref 0–0.2)
BASOPHILS NFR BLD: 0.2 % (ref 0–1.9)
BILIRUB DIRECT SERPL-MCNC: 0.6 MG/DL (ref 0.1–0.3)
BILIRUB SERPL-MCNC: 1.9 MG/DL (ref 0.1–1)
BUN SERPL-MCNC: 22 MG/DL (ref 8–23)
CALCIUM SERPL-MCNC: 9.7 MG/DL (ref 8.7–10.5)
CHLORIDE SERPL-SCNC: 98 MMOL/L (ref 95–110)
CO2 SERPL-SCNC: 25 MMOL/L (ref 23–29)
CREAT SERPL-MCNC: 1.3 MG/DL (ref 0.5–1.4)
DIFFERENTIAL METHOD: ABNORMAL
EOSINOPHIL # BLD AUTO: 0 K/UL (ref 0–0.5)
EOSINOPHIL NFR BLD: 0 % (ref 0–8)
ERYTHROCYTE [DISTWIDTH] IN BLOOD BY AUTOMATED COUNT: 13.1 % (ref 11.5–14.5)
EST. GFR  (NO RACE VARIABLE): 57 ML/MIN/1.73 M^2
GLUCOSE SERPL-MCNC: 255 MG/DL (ref 70–110)
HCT VFR BLD AUTO: 42.5 % (ref 40–54)
HGB BLD-MCNC: 14.7 G/DL (ref 14–18)
IMM GRANULOCYTES # BLD AUTO: 0.08 K/UL (ref 0–0.04)
IMM GRANULOCYTES NFR BLD AUTO: 0.5 % (ref 0–0.5)
INFLUENZA A, MOLECULAR: NEGATIVE
INFLUENZA B, MOLECULAR: NEGATIVE
LYMPHOCYTES # BLD AUTO: 0.9 K/UL (ref 1–4.8)
LYMPHOCYTES NFR BLD: 4.9 % (ref 18–48)
MCH RBC QN AUTO: 30.8 PG (ref 27–31)
MCHC RBC AUTO-ENTMCNC: 34.6 G/DL (ref 32–36)
MCV RBC AUTO: 89 FL (ref 82–98)
MONOCYTES # BLD AUTO: 1.1 K/UL (ref 0.3–1)
MONOCYTES NFR BLD: 6.3 % (ref 4–15)
NEUTROPHILS # BLD AUTO: 15.6 K/UL (ref 1.8–7.7)
NEUTROPHILS NFR BLD: 88.1 % (ref 38–73)
NRBC BLD-RTO: 0 /100 WBC
PLATELET # BLD AUTO: 197 K/UL (ref 150–450)
PMV BLD AUTO: 12 FL (ref 9.2–12.9)
POCT GLUCOSE: 164 MG/DL (ref 70–110)
POCT GLUCOSE: 193 MG/DL (ref 70–110)
POCT GLUCOSE: 228 MG/DL (ref 70–110)
POCT GLUCOSE: 267 MG/DL (ref 70–110)
POTASSIUM SERPL-SCNC: 4.1 MMOL/L (ref 3.5–5.1)
PROT SERPL-MCNC: 7.3 G/DL (ref 6–8.4)
RBC # BLD AUTO: 4.77 M/UL (ref 4.6–6.2)
RPR SER QL: NORMAL
RSV AG SPEC QL IA: NEGATIVE
SARS-COV-2 RDRP RESP QL NAA+PROBE: NEGATIVE
SODIUM SERPL-SCNC: 136 MMOL/L (ref 136–145)
SPECIMEN SOURCE: NORMAL
SPECIMEN SOURCE: NORMAL
WBC # BLD AUTO: 17.68 K/UL (ref 3.9–12.7)

## 2022-12-30 PROCEDURE — 25000003 PHARM REV CODE 250: Performed by: STUDENT IN AN ORGANIZED HEALTH CARE EDUCATION/TRAINING PROGRAM

## 2022-12-30 PROCEDURE — 87502 INFLUENZA DNA AMP PROBE: CPT | Performed by: STUDENT IN AN ORGANIZED HEALTH CARE EDUCATION/TRAINING PROGRAM

## 2022-12-30 PROCEDURE — 27000221 HC OXYGEN, UP TO 24 HOURS

## 2022-12-30 PROCEDURE — 63600175 PHARM REV CODE 636 W HCPCS: Performed by: STUDENT IN AN ORGANIZED HEALTH CARE EDUCATION/TRAINING PROGRAM

## 2022-12-30 PROCEDURE — 85025 COMPLETE CBC W/AUTO DIFF WBC: CPT | Performed by: STUDENT IN AN ORGANIZED HEALTH CARE EDUCATION/TRAINING PROGRAM

## 2022-12-30 PROCEDURE — 25000003 PHARM REV CODE 250

## 2022-12-30 PROCEDURE — 11000001 HC ACUTE MED/SURG PRIVATE ROOM

## 2022-12-30 PROCEDURE — U0002 COVID-19 LAB TEST NON-CDC: HCPCS | Performed by: STUDENT IN AN ORGANIZED HEALTH CARE EDUCATION/TRAINING PROGRAM

## 2022-12-30 PROCEDURE — 87634 RSV DNA/RNA AMP PROBE: CPT | Performed by: STUDENT IN AN ORGANIZED HEALTH CARE EDUCATION/TRAINING PROGRAM

## 2022-12-30 PROCEDURE — 99233 SBSQ HOSP IP/OBS HIGH 50: CPT | Mod: ,,, | Performed by: PSYCHIATRY & NEUROLOGY

## 2022-12-30 PROCEDURE — 99233 PR SUBSEQUENT HOSPITAL CARE,LEVL III: ICD-10-PCS | Mod: ,,, | Performed by: PSYCHIATRY & NEUROLOGY

## 2022-12-30 PROCEDURE — 82248 BILIRUBIN DIRECT: CPT | Performed by: STUDENT IN AN ORGANIZED HEALTH CARE EDUCATION/TRAINING PROGRAM

## 2022-12-30 PROCEDURE — 36415 COLL VENOUS BLD VENIPUNCTURE: CPT | Performed by: STUDENT IN AN ORGANIZED HEALTH CARE EDUCATION/TRAINING PROGRAM

## 2022-12-30 PROCEDURE — 90833 PR PSYCHOTHERAPY W/PATIENT W/E&M, 30 MIN (ADD ON): ICD-10-PCS | Mod: ,,, | Performed by: PSYCHIATRY & NEUROLOGY

## 2022-12-30 PROCEDURE — 80053 COMPREHEN METABOLIC PANEL: CPT | Performed by: STUDENT IN AN ORGANIZED HEALTH CARE EDUCATION/TRAINING PROGRAM

## 2022-12-30 PROCEDURE — 92526 ORAL FUNCTION THERAPY: CPT

## 2022-12-30 PROCEDURE — 97535 SELF CARE MNGMENT TRAINING: CPT

## 2022-12-30 PROCEDURE — 94761 N-INVAS EAR/PLS OXIMETRY MLT: CPT

## 2022-12-30 PROCEDURE — 90833 PSYTX W PT W E/M 30 MIN: CPT | Mod: ,,, | Performed by: PSYCHIATRY & NEUROLOGY

## 2022-12-30 PROCEDURE — 99900035 HC TECH TIME PER 15 MIN (STAT)

## 2022-12-30 RX ORDER — CYANOCOBALAMIN 1000 UG/ML
1000 INJECTION, SOLUTION INTRAMUSCULAR; SUBCUTANEOUS
Qty: 1 ML | Refills: 3 | Status: SHIPPED | OUTPATIENT
Start: 2023-01-11

## 2022-12-30 RX ORDER — DIVALPROEX SODIUM 250 MG/1
250 TABLET, DELAYED RELEASE ORAL EVERY 8 HOURS
Qty: 90 TABLET | Refills: 11 | Status: SHIPPED | OUTPATIENT
Start: 2022-12-30 | End: 2023-12-30

## 2022-12-30 RX ORDER — HYDROCHLOROTHIAZIDE 12.5 MG/1
12.5 TABLET ORAL DAILY
Qty: 30 TABLET | Refills: 11 | Status: SHIPPED | OUTPATIENT
Start: 2022-12-31 | End: 2023-12-31

## 2022-12-30 RX ORDER — CYANOCOBALAMIN 1000 UG/ML
1000 INJECTION, SOLUTION INTRAMUSCULAR; SUBCUTANEOUS WEEKLY
Qty: 1 ML | Refills: 2 | Status: SHIPPED | OUTPATIENT
Start: 2023-01-04 | End: 2023-01-19

## 2022-12-30 RX ORDER — SERTRALINE HYDROCHLORIDE 25 MG/1
25 TABLET, FILM COATED ORAL DAILY
Qty: 30 TABLET | Refills: 11 | Status: SHIPPED | OUTPATIENT
Start: 2022-12-30 | End: 2023-12-30

## 2022-12-30 RX ORDER — AMLODIPINE BESYLATE 10 MG/1
10 TABLET ORAL DAILY
Qty: 30 TABLET | Refills: 11 | Status: SHIPPED | OUTPATIENT
Start: 2022-12-31 | End: 2023-12-31

## 2022-12-30 RX ORDER — ATORVASTATIN CALCIUM 40 MG/1
40 TABLET, FILM COATED ORAL DAILY
Qty: 90 TABLET | Refills: 3 | Status: SHIPPED | OUTPATIENT
Start: 2022-12-31 | End: 2023-12-31

## 2022-12-30 RX ORDER — CYANOCOBALAMIN 1000 UG/ML
1000 INJECTION, SOLUTION INTRAMUSCULAR; SUBCUTANEOUS
Qty: 1 ML | Refills: 3 | Status: SHIPPED | OUTPATIENT
Start: 2022-12-30 | End: 2022-12-30 | Stop reason: SDUPTHER

## 2022-12-30 RX ORDER — FOLIC ACID 1 MG/1
1 TABLET ORAL DAILY
Qty: 30 TABLET | Refills: 3 | Status: SHIPPED | OUTPATIENT
Start: 2022-12-31 | End: 2023-04-30

## 2022-12-30 RX ORDER — SERTRALINE HYDROCHLORIDE 25 MG/1
25 TABLET, FILM COATED ORAL DAILY
Status: DISCONTINUED | OUTPATIENT
Start: 2022-12-30 | End: 2023-01-04 | Stop reason: HOSPADM

## 2022-12-30 RX ADMIN — ATORVASTATIN CALCIUM 40 MG: 40 TABLET, FILM COATED ORAL at 10:12

## 2022-12-30 RX ADMIN — FOLIC ACID 1 MG: 1 TABLET ORAL at 10:12

## 2022-12-30 RX ADMIN — THERA TABS 1 TABLET: TAB at 10:12

## 2022-12-30 RX ADMIN — HYDROCHLOROTHIAZIDE 12.5 MG: 12.5 TABLET ORAL at 10:12

## 2022-12-30 RX ADMIN — THIAMINE HYDROCHLORIDE 500 MG: 100 INJECTION, SOLUTION INTRAMUSCULAR; INTRAVENOUS at 10:12

## 2022-12-30 RX ADMIN — SERTRALINE HYDROCHLORIDE 25 MG: 25 TABLET ORAL at 10:12

## 2022-12-30 RX ADMIN — ENOXAPARIN SODIUM 40 MG: 40 INJECTION SUBCUTANEOUS at 05:12

## 2022-12-30 RX ADMIN — DIVALPROEX SODIUM 250 MG: 250 TABLET, DELAYED RELEASE ORAL at 06:12

## 2022-12-30 RX ADMIN — THIAMINE HYDROCHLORIDE 500 MG: 100 INJECTION, SOLUTION INTRAMUSCULAR; INTRAVENOUS at 03:12

## 2022-12-30 RX ADMIN — INSULIN ASPART 3 UNITS: 100 INJECTION, SOLUTION INTRAVENOUS; SUBCUTANEOUS at 10:12

## 2022-12-30 RX ADMIN — DIVALPROEX SODIUM 250 MG: 250 TABLET, DELAYED RELEASE ORAL at 03:12

## 2022-12-30 RX ADMIN — DIVALPROEX SODIUM 250 MG: 250 TABLET, DELAYED RELEASE ORAL at 10:12

## 2022-12-30 RX ADMIN — AMLODIPINE BESYLATE 10 MG: 5 TABLET ORAL at 10:12
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
## 2022-12-30 NOTE — PT/OT/SLP PROGRESS
"Physical Therapy      Patient Name:  Raymundo Oneal   MRN:  [id number]    1207 - Patient's family members were present and asked for PTA to try to see pt tomorrow secondary to "I don't think today is a good day."  Pt sitting up in bed eating with family assist.  When family member asked pt if he wanted to get up, pt responded, "No."  Will follow-up next tx date.    "

## 2022-12-30 NOTE — PLAN OF CARE
Mark spoke with Richa [phone] with the [facility]. Per Richa, admissions is currently in a meeting. Richa took Mark's contact information for admissions to return call. Mark will follow up later.     Mark attempted to call the [facility] [phone], no answer was noted, and no voicemail was set up.     Mark spoke with Connie [phone] with the [facility]. Per Connie, admissions is currently in a meeting. Connie took Mark's contact information for admissions to return call. Mark will follow up later.     Mark attempted to call pt's daughter Leona [phone], no answer was noted. Mark left voicemail. Mark will follow up later.     Mark will continue to follow.        12/30/22 1175   Post-Acute Status   Post-Acute Authorization Placement
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
## 2022-12-30 NOTE — PLAN OF CARE
SW received call from pts daughter to discuss dc planning. SW expressed that dc disposition is still pending at this time. Pts daughter expressed understanding. SW will continue to follow pt throughout his transitions of care and assist with any dc needs. Pts daughter requested to speak with MD.     RAJESH notified MD of pts daughter request.        12/30/22 9893   Post-Acute Status   Post-Acute Authorization Other

## 2022-12-30 NOTE — PT/OT/SLP PROGRESS
Occupational Therapy      Patient Name:  Raymundo Oneal   MRN:  [id number]    Patient not seen today secondary to  (Caregiver in room asked that pt be seen tomorrowfor OT eval as he was just settling down after being agitated earlier.). Will follow-up 12/31/2022.    12/30/2022

## 2022-12-30 NOTE — PROGRESS NOTES
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    Mono # 1.1 (H) 0.3 - 1.0 K/uL    Eos # 0.0 0.0 - 0.5 K/uL    Baso # 0.03 0.00 - 0.20 K/uL    nRBC 0 0 /100 WBC    Gran % 88.1 (H) 38.0 - 73.0 %    Lymph % 4.9 (L) 18.0 - 48.0 %    Mono % 6.3 4.0 - 15.0 %    Eosinophil % 0.0 0.0 - 8.0 %    Basophil % 0.2 0.0 - 1.9 %    Differential Method Automated    Comprehensive metabolic panel    Collection Time: 12/30/22  3:46 AM   Result Value Ref Range    Sodium 136 136 - 145 mmol/L    Potassium 4.1 3.5 - 5.1 mmol/L    Chloride 98 95 - 110 mmol/L    CO2 25 23 - 29 mmol/L    Glucose 255 (H) 70 - 110 mg/dL    BUN 22 8 - 23 mg/dL    Creatinine 1.3 0.5 - 1.4 mg/dL    Calcium 9.7 8.7 - 10.5 mg/dL    Total Protein 7.3 6.0 - 8.4 g/dL    Albumin 3.7 3.5 - 5.2 g/dL    Total Bilirubin 1.9 (H) 0.1 - 1.0 mg/dL    Alkaline Phosphatase 51 (L) 55 - 135 U/L    AST 15 10 - 40 U/L    ALT 10 10 - 44 U/L    Anion Gap 13 8 - 16 mmol/L    eGFR 57 (A) >60 mL/min/1.73 m^2   POCT glucose    Collection Time: 12/30/22  6:26 AM   Result Value Ref Range    POCT Glucose 267 (H) 70 - 110 mg/dL      No results found for: PHENYTOIN, PHENOBARB, VALPROATE, CBMZ      EXAMINATION    VITALS   Vitals:    12/29/22 2007 12/30/22 0024 12/30/22 0449 12/30/22 0815   BP: (!) 178/84 (!) 128/90 (!) 153/72 (!) 161/85   BP Location:       Patient Position: Lying Lying Lying    Pulse: (!) 113 105 99 91   Resp: 20  "(!) 22 (!) 22 20   Temp: 98.8 °F (37.1 °C) 96.3 °F (35.7 °C) 98 °F (36.7 °C) 98.4 °F (36.9 °C)   TempSrc: Axillary Oral Oral    SpO2: 96% (!) 94% (!) 94% 98%   Weight:       Height:          CONSTITUTIONAL  General Appearance: NAD, unremarkable, age appropriate, normal weight, disheveled, lying in bed, less restless and fidgety     MUSCULOSKELETAL  Muscle Strength and Tone: Attempted but unable to assess due to patient's AMS / Dementia   Abnormal Involuntary Movements: none observed ; intermittent minimal tremors in BUEs  Gait and Station: Attempted but unable to assess due to patient's AMS / Dementia      PSYCHIATRIC   Behavior/Cooperation:  cooperative, less restless and fidgety, eye contact normal  Speech:  normal tone, normal rate, normal pitch, normal volume, intermittently disorganized and illogical   Language: grossly intact with spontaneous speech  Mood:  "OK"  Affect:  more constricted and irritable this morning   Associations: +VINAY  Thought Process: Linear with intermittent disorganization   Thought Content: denies SI, HI, AH, VH ; + delusional TC ; + RIS   Sensorium:  Awake  Alert and Oriented: to person, type of place, name of place, state, and month.  He remains disoriented to city, year, and situation.   Memory: 3/3 immediate, 0/3 at 5 minutes               Recent:  Impaired ; unable to report recent events              Remote:  Impaired ; Named 0/4 past presidents   Attention/concentration: Impaired / Limited. Unable to spell w-o-r-l-d & d-l-r-o-w.   Similarities: Impaired / Limited (difference between apple and orange?)  Abstract reasoning: Impaired / Limited  Fund of Knowledge: Named 0/4 past presidents   Insight: Impaired / Limited   Judgment: Impaired / Limited      CAM ICU Delirium Assessment - Initially, he was CAM-ICU positive for delirium, but he caught his mistakes and corrected (some awareness of confusion / neurocognitive deficits).  He remains exhibiting notably poor attention / " concentration (failed SAVEAHAART) on exam.      Is the patient aware of the biomedical complications associated with substance abuse and mental illness? Attempted but unable to assess due to patient's AMS / Dementia.           MEDICAL DECISION MAKING     ASSESSMENT         Vascular Dementia with Behavioral Disturbance   (Rule out Delirium due to Medical Condition with Behavioral Disturbance)   Alcohol Abuse         RECOMMENDATIONS       - I spoke to the patient's primary team in detail about this patient.  If reversible causes of acute AMS are ruled out and patient remains with behavioral disturbances in dementia (delusions, hallucinations, etc.), then would seek anh-psych admission under a [facility] for grave disability for mental health stabilization prior to likely need for [facility] nursing home placement.      - Continue Depakote 250 mg PO TID (Depacon IV is available if needed) for behavioral disturbances in dementia (attempted to discuss risks/benefits/alt vs no treatment with patient).    - Begin Zoloft 25 mg PO daily for mood / behavioral disturbances in dementia (attempted to discuss risks/benefits/alt vs no treatment with patient).    - If necessary, can begin Risperdal 0.5 mg PO BID for mood / behavioral disturbances in dementia until the Depakote begins having its desired stabilizing effects (attempted to discuss risks/benefits/alt vs no treatment with patient).     Implement / Continue the below DELIRIUM BEHAVIOR MANAGEMENT:  - Minimize use of restraints; if physical restraints necessary, please utilize medical/chemical prns for agitation (can use Zyprexa 2.5 mg to 5 mg PO/IM q8 hours PRN).  - Keep shades open and room lit during day and room dim at night in order to promote healthy circadian rhythms.  - Encourage family at bedside.  - Keep whiteboard in patient's room current with the date and name of the members of patient's team for easy patient self re-orientation.  - Avoid benzodiazepines (unless  treating alcohol withdrawal with [facility] protocol), antihistamines, anticholinergics, hypnotics, and minimize opiates while controlling for pain as these medications may exacerbate delirium.      - With reasonable medical certainty, based on a present state examination, the patient currently DOES NOT appear to have medical decision-making capacity as made evident by his inability to cognitively utilize information provided to him to express a choice that is stable over time, to understand the relevant information pertaining his diagnoses and recommended treatments, to appreciate the consequences of the decision (risks vs benefits) regarding accepting vs refusing treatment, and to manipulate all of the data in a logical fashion.  Please seek the appropriate surrogate decision maker.       - Patient's most recent labs, imaging, and EKG were reviewed again today ; RPR negative ; HIV negative ; B1 Pending ; Primary Team to further investigate elevated WBC with left shift and increasing TBili (began prior to initiating Depakote) ; repeat UA pending.  Monitor EKG to ensure QTc remains below 500 if frequent neuroleptics are required.       - Begin / Continue supplementing B12, Folate, Thiamine (IV Thiamine supplementation), and Multi-Vitamin      - Psychotherapy was again performed with patient as noted above with a focus on improving re-orientation, mood, behavioral modification in AMS / dementia, insomnia, insight / reality orientation, and alcohol cessation / total sobriety.     - Continue monitoring for alcohol withdrawal with [facility] protocol.       - Thank you for this consult ; will continue to follow patient while at [facility]         Total time spent with patient and/or managing/coordinating patient's care today (excluding the time spent on psychotherapy): 52 minutes   Time spent on psychotherapy today (as noted above): 21 minutes   Total time for encounter today including psychotherapy: 73 minutes      More  than 50% of the time was spent counseling/coordinating care.     Consulting clinician was informed of the encounter and consult note.      STAFF:  Fili Romero MD  [facility]  12/30/2022

## 2022-12-30 NOTE — PT/OT/SLP PROGRESS
"Speech Language Pathology Treatment    Patient Name:  Raymundo Oneal   MRN:  [id number]  Admitting Diagnosis: Moderate vascular dementia with agitation    Recommendations:              Diet recommendations:  Mechanical soft, Thin   Aspiration Precautions: 1 bite/sip at a time, Alternating bites/sips, Assistance with meals, Avoid talking while eating, Eliminate distractions, Feed only when awake/alert, HOB to 90 degrees, Meds whole 1 at a time, Remain upright 30 minutes post meal, Small bites/sips, and Standard aspiration precautions   General Precautions: Standard, aspiration, fall  Communication strategies:  Yes/no questions only, provide increased time to answer, and go to room if call light pushed, redirect and reorient     Subjective     Pt seen in room, he is awake and in hand restraints. Pt had been cleaned up by PCT prior to entrance.   Patient goals: "I am doing ok."     Pain/Comfort:  Pain Rating 1: 0/10    Respiratory Status: NC    Objective:     Has the patient been evaluated by SLP for swallowing?   Yes  Keep patient NPO? No   Current Respiratory Status:    NC    Swallowing: Pt found awake and alert. He is able to state his name, , and where he is. Pt also stated place of residence.   PCT reported good intake with meal this am. Pt did agree to PO trials with SLP. Pt tolerated sips of boost with no coughing or choking. No change in voice. Pt tolerated pudding by tsp x3 and diced peaches x4 bites, good oral motor clearance noted and fair mastication. No oral residue noted and able to take liquid swallows in between when eating.   Pt may continue on current diet level with boost as needed for caloric support.     Assessment:     Raymundo Oneal is a 77 y.o. male admitted with Moderate vascular dementia with agitation who presents with ability to continue on oral diet of mechanical soft and thin liquids     Goals:   Multidisciplinary Problems       SLP Goals       Not on file                    Plan: "       Plan of Care expires:     Plan of Care reviewed with:  patient   SLP Follow-Up:  No       Discharge recommendations:  nursing facility, basic   Barriers to Discharge:  None    Time Tracking:     SLP Treatment Date:   12/30/22  Speech Start Time:  1040  Speech Stop Time:  1102     Speech Total Time (min):  22 min    Billable Minutes: Treatment Swallowing Dysfunction 11 and Self Care/Home Management Training 11    12/30/2022

## 2022-12-31 LAB
ALBUMIN SERPL BCP-MCNC: 3.6 G/DL (ref 3.5–5.2)
ALP SERPL-CCNC: 50 U/L (ref 55–135)
ALT SERPL W/O P-5'-P-CCNC: 10 U/L (ref 10–44)
ANION GAP SERPL CALC-SCNC: 11 MMOL/L (ref 8–16)
AST SERPL-CCNC: 13 U/L (ref 10–40)
BASOPHILS # BLD AUTO: 0.04 K/UL (ref 0–0.2)
BASOPHILS NFR BLD: 0.3 % (ref 0–1.9)
BILIRUB SERPL-MCNC: 2 MG/DL (ref 0.1–1)
BUN SERPL-MCNC: 30 MG/DL (ref 8–23)
CALCIUM SERPL-MCNC: 9.5 MG/DL (ref 8.7–10.5)
CHLORIDE SERPL-SCNC: 97 MMOL/L (ref 95–110)
CO2 SERPL-SCNC: 24 MMOL/L (ref 23–29)
CREAT SERPL-MCNC: 1.2 MG/DL (ref 0.5–1.4)
DIFFERENTIAL METHOD: ABNORMAL
EOSINOPHIL # BLD AUTO: 0.1 K/UL (ref 0–0.5)
EOSINOPHIL NFR BLD: 0.8 % (ref 0–8)
ERYTHROCYTE [DISTWIDTH] IN BLOOD BY AUTOMATED COUNT: 12.9 % (ref 11.5–14.5)
EST. GFR  (NO RACE VARIABLE): >60 ML/MIN/1.73 M^2
GLUCOSE SERPL-MCNC: 168 MG/DL (ref 70–110)
HAPTOGLOB SERPL-MCNC: 245 MG/DL (ref 30–250)
HCT VFR BLD AUTO: 41.9 % (ref 40–54)
HGB BLD-MCNC: 14.4 G/DL (ref 14–18)
IMM GRANULOCYTES # BLD AUTO: 0.04 K/UL (ref 0–0.04)
IMM GRANULOCYTES NFR BLD AUTO: 0.3 % (ref 0–0.5)
LDH SERPL L TO P-CCNC: 165 U/L (ref 110–260)
LYMPHOCYTES # BLD AUTO: 1.6 K/UL (ref 1–4.8)
LYMPHOCYTES NFR BLD: 11.2 % (ref 18–48)
MCH RBC QN AUTO: 30.6 PG (ref 27–31)
MCHC RBC AUTO-ENTMCNC: 34.4 G/DL (ref 32–36)
MCV RBC AUTO: 89 FL (ref 82–98)
MONOCYTES # BLD AUTO: 1.3 K/UL (ref 0.3–1)
MONOCYTES NFR BLD: 8.9 % (ref 4–15)
NEUTROPHILS # BLD AUTO: 11.1 K/UL (ref 1.8–7.7)
NEUTROPHILS NFR BLD: 78.5 % (ref 38–73)
NRBC BLD-RTO: 0 /100 WBC
PLATELET # BLD AUTO: 164 K/UL (ref 150–450)
PMV BLD AUTO: 12.5 FL (ref 9.2–12.9)
POCT GLUCOSE: 179 MG/DL (ref 70–110)
POCT GLUCOSE: 181 MG/DL (ref 70–110)
POCT GLUCOSE: 212 MG/DL (ref 70–110)
POCT GLUCOSE: 248 MG/DL (ref 70–110)
POTASSIUM SERPL-SCNC: 3.9 MMOL/L (ref 3.5–5.1)
PROT SERPL-MCNC: 7.1 G/DL (ref 6–8.4)
RBC # BLD AUTO: 4.7 M/UL (ref 4.6–6.2)
SODIUM SERPL-SCNC: 132 MMOL/L (ref 136–145)
WBC # BLD AUTO: 14.16 K/UL (ref 3.9–12.7)

## 2022-12-31 PROCEDURE — 83010 ASSAY OF HAPTOGLOBIN QUANT: CPT | Performed by: STUDENT IN AN ORGANIZED HEALTH CARE EDUCATION/TRAINING PROGRAM

## 2022-12-31 PROCEDURE — 97530 THERAPEUTIC ACTIVITIES: CPT

## 2022-12-31 PROCEDURE — 85025 COMPLETE CBC W/AUTO DIFF WBC: CPT | Performed by: STUDENT IN AN ORGANIZED HEALTH CARE EDUCATION/TRAINING PROGRAM

## 2022-12-31 PROCEDURE — 11000001 HC ACUTE MED/SURG PRIVATE ROOM

## 2022-12-31 PROCEDURE — 27000221 HC OXYGEN, UP TO 24 HOURS

## 2022-12-31 PROCEDURE — 63600175 PHARM REV CODE 636 W HCPCS: Performed by: STUDENT IN AN ORGANIZED HEALTH CARE EDUCATION/TRAINING PROGRAM

## 2022-12-31 PROCEDURE — 83615 LACTATE (LD) (LDH) ENZYME: CPT | Performed by: STUDENT IN AN ORGANIZED HEALTH CARE EDUCATION/TRAINING PROGRAM

## 2022-12-31 PROCEDURE — 99900035 HC TECH TIME PER 15 MIN (STAT)

## 2022-12-31 PROCEDURE — 94761 N-INVAS EAR/PLS OXIMETRY MLT: CPT

## 2022-12-31 PROCEDURE — 25000003 PHARM REV CODE 250: Performed by: STUDENT IN AN ORGANIZED HEALTH CARE EDUCATION/TRAINING PROGRAM

## 2022-12-31 PROCEDURE — 97165 OT EVAL LOW COMPLEX 30 MIN: CPT

## 2022-12-31 PROCEDURE — 36415 COLL VENOUS BLD VENIPUNCTURE: CPT | Performed by: STUDENT IN AN ORGANIZED HEALTH CARE EDUCATION/TRAINING PROGRAM

## 2022-12-31 PROCEDURE — 80053 COMPREHEN METABOLIC PANEL: CPT | Performed by: STUDENT IN AN ORGANIZED HEALTH CARE EDUCATION/TRAINING PROGRAM

## 2022-12-31 PROCEDURE — 97530 THERAPEUTIC ACTIVITIES: CPT | Mod: CQ

## 2022-12-31 PROCEDURE — 25000003 PHARM REV CODE 250

## 2022-12-31 RX ADMIN — ATORVASTATIN CALCIUM 40 MG: 40 TABLET, FILM COATED ORAL at 10:12

## 2022-12-31 RX ADMIN — FOLIC ACID 1 MG: 1 TABLET ORAL at 10:12

## 2022-12-31 RX ADMIN — THERA TABS 1 TABLET: TAB at 10:12

## 2022-12-31 RX ADMIN — DIVALPROEX SODIUM 250 MG: 250 TABLET, DELAYED RELEASE ORAL at 02:12

## 2022-12-31 RX ADMIN — DIVALPROEX SODIUM 250 MG: 250 TABLET, DELAYED RELEASE ORAL at 06:12

## 2022-12-31 RX ADMIN — THIAMINE HYDROCHLORIDE 500 MG: 100 INJECTION, SOLUTION INTRAMUSCULAR; INTRAVENOUS at 02:12

## 2022-12-31 RX ADMIN — SERTRALINE HYDROCHLORIDE 25 MG: 25 TABLET ORAL at 10:12

## 2022-12-31 RX ADMIN — ENOXAPARIN SODIUM 40 MG: 40 INJECTION SUBCUTANEOUS at 05:12

## 2022-12-31 RX ADMIN — THIAMINE HYDROCHLORIDE 500 MG: 100 INJECTION, SOLUTION INTRAMUSCULAR; INTRAVENOUS at 09:12

## 2022-12-31 RX ADMIN — AMLODIPINE BESYLATE 10 MG: 5 TABLET ORAL at 10:12

## 2022-12-31 RX ADMIN — DIVALPROEX SODIUM 250 MG: 250 TABLET, DELAYED RELEASE ORAL at 09:12

## 2022-12-31 RX ADMIN — HYDROCHLOROTHIAZIDE 12.5 MG: 12.5 TABLET ORAL at 10:12
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
## 2022-12-31 NOTE — PLAN OF CARE
Pt would benefit from cont OT services in order to maximize functional independence. Recommending post acute placement with 24/7 care/assistance. OT candice performed this date with PT, pt agreeable to therapy. Pt pleasant and agreeable throughout session. Pt only oriented to self. Pt performing sit<>stand with CGA & no AD. Pt taking side steps to HOB with Juliann & HHA.     Problem: Occupational Therapy  Goal: Occupational Therapy Goal  Description: Goals to be met by: 1/31/2023     Patient will increase functional independence with ADLs by performing:    Toileting from bedside commode with Minimal Assistance for hygiene and clothing management.   Step transfer with Minimal Assistance & appropriate AD.  Toilet transfer to bedside commode with Minimal Assistance & appropriate AD.    Outcome: Ongoing, Progressing

## 2022-12-31 NOTE — PT/OT/SLP PROGRESS
Physical Therapy Treatment    Patient Name:  Raymundo Oneal   MRN:  [id number]    Recommendations:     Discharge Recommendations: nursing facility, basic  Discharge Equipment Recommendations: other (see comments) (TBD)  Barriers to discharge:  Altered mental status requires 24/7 assistance.     Assessment:     Raymundo Oneal is a 77 y.o. male admitted with a medical diagnosis of Moderate vascular dementia with agitation.  He presents with the following impairments/functional limitations: weakness, impaired cognition, impaired endurance, decreased coordination, decreased lower extremity function, impaired functional mobility, gait instability, decreased safety awareness, impaired balance.    Rehab Prognosis: Good; patient would benefit from acute skilled PT services to address these deficits and reach maximum level of function.    Recent Surgery: * No surgery found *      Plan:     During this hospitalization, patient to be seen 3 x/week to address the identified rehab impairments via gait training, therapeutic activities, therapeutic exercises, neuromuscular re-education and progress toward the following goals:    Plan of Care Expires:  01/28/23    Subjective     Chief Complaint: Pt with no complaints or concerns at this time.   Patient/Family Comments/goals: Pt agreeable to PT treatment.   Pain/Comfort:  Pain Rating 1: 0/10      Objective:     Communicated with nursing prior to session.  Patient found supine with telemetry upon PT entry to room.     General Precautions: Standard, fall, hearing impaired  Orthopedic Precautions: N/A  Braces: N/A  Respiratory Status: Room air     Functional Mobility:  Bed Mobility:     Supine to Sit: stand by assistance  Sit to Supine: stand by assistance  Transfers:     Sit to Stand:  contact guard assistance with no AD  Gait: Pt performed lateral stepping along EOB requiring min assist and max verbal cueing on proper movement sequencing.  Pt with moderate posterior lean when in  standing.   Balance: Pt with good sitting and poor + standing balance.       AM-PAC 6 CLICK MOBILITY  Turning over in bed (including adjusting bedclothes, sheets and blankets)?: 4  Sitting down on and standing up from a chair with arms (e.g., wheelchair, bedside commode, etc.): 3  Moving from lying on back to sitting on the side of the bed?: 4  Moving to and from a bed to a chair (including a wheelchair)?: 3  Need to walk in hospital room?: 2  Climbing 3-5 steps with a railing?: 2  Basic Mobility Total Score: 18       Treatment & Education:      Patient left HOB elevated with all lines intact, call button in reach, bed alarm on, and OT as well as family  present.    GOALS:   Multidisciplinary Problems       Physical Therapy Goals          Problem: Physical Therapy    Goal Priority Disciplines Outcome Goal Variances Interventions   Physical Therapy Goal     PT, PT/OT Ongoing, Progressing     Description: Goals to be met by: discharge date     Patient will increase functional independence with mobility by performin. Sit to stand transfer with Modified [place] demonstrating good safety w technique.  2. Gait  x 150 feet with Stand-by Assistance using Rolling Walker demonstrating safe technique w AD use.  3.  Gait x 50 feet with SBA without AD use without demo Loss of Balance during straight path and change in direction.                            Time Tracking:     PT Received On: 22  PT Start Time: 1408     PT Stop Time: 1426  PT Total Time (min): 18 min     Billable Minutes: Therapeutic Activity 18    Treatment Type: Treatment  PT/PTA: PTA     PTA Visit Number: 1     2022
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
## 2023-01-01 LAB
ALBUMIN SERPL BCP-MCNC: 3.4 G/DL (ref 3.5–5.2)
ALP SERPL-CCNC: 51 U/L (ref 55–135)
ALT SERPL W/O P-5'-P-CCNC: 11 U/L (ref 10–44)
ANION GAP SERPL CALC-SCNC: 13 MMOL/L (ref 8–16)
AST SERPL-CCNC: 17 U/L (ref 10–40)
BASOPHILS # BLD AUTO: 0.05 K/UL (ref 0–0.2)
BASOPHILS NFR BLD: 0.5 % (ref 0–1.9)
BILIRUB SERPL-MCNC: 1.6 MG/DL (ref 0.1–1)
BUN SERPL-MCNC: 28 MG/DL (ref 8–23)
CALCIUM SERPL-MCNC: 9.3 MG/DL (ref 8.7–10.5)
CHLORIDE SERPL-SCNC: 96 MMOL/L (ref 95–110)
CO2 SERPL-SCNC: 25 MMOL/L (ref 23–29)
CREAT SERPL-MCNC: 1.1 MG/DL (ref 0.5–1.4)
DIFFERENTIAL METHOD: ABNORMAL
EOSINOPHIL # BLD AUTO: 0.2 K/UL (ref 0–0.5)
EOSINOPHIL NFR BLD: 1.9 % (ref 0–8)
ERYTHROCYTE [DISTWIDTH] IN BLOOD BY AUTOMATED COUNT: 12.6 % (ref 11.5–14.5)
EST. GFR  (NO RACE VARIABLE): >60 ML/MIN/1.73 M^2
GLUCOSE SERPL-MCNC: 161 MG/DL (ref 70–110)
HCT VFR BLD AUTO: 40.2 % (ref 40–54)
HGB BLD-MCNC: 14.1 G/DL (ref 14–18)
IMM GRANULOCYTES # BLD AUTO: 0.03 K/UL (ref 0–0.04)
IMM GRANULOCYTES NFR BLD AUTO: 0.3 % (ref 0–0.5)
LYMPHOCYTES # BLD AUTO: 1.6 K/UL (ref 1–4.8)
LYMPHOCYTES NFR BLD: 17.3 % (ref 18–48)
MCH RBC QN AUTO: 31.1 PG (ref 27–31)
MCHC RBC AUTO-ENTMCNC: 35.1 G/DL (ref 32–36)
MCV RBC AUTO: 89 FL (ref 82–98)
MONOCYTES # BLD AUTO: 1.2 K/UL (ref 0.3–1)
MONOCYTES NFR BLD: 13.1 % (ref 4–15)
NEUTROPHILS # BLD AUTO: 6.2 K/UL (ref 1.8–7.7)
NEUTROPHILS NFR BLD: 66.9 % (ref 38–73)
NRBC BLD-RTO: 0 /100 WBC
PLATELET # BLD AUTO: 163 K/UL (ref 150–450)
PMV BLD AUTO: 13.1 FL (ref 9.2–12.9)
POCT GLUCOSE: 131 MG/DL (ref 70–110)
POCT GLUCOSE: 173 MG/DL (ref 70–110)
POCT GLUCOSE: 212 MG/DL (ref 70–110)
POCT GLUCOSE: 229 MG/DL (ref 70–110)
POTASSIUM SERPL-SCNC: 3.8 MMOL/L (ref 3.5–5.1)
PROT SERPL-MCNC: 7 G/DL (ref 6–8.4)
RBC # BLD AUTO: 4.54 M/UL (ref 4.6–6.2)
SODIUM SERPL-SCNC: 134 MMOL/L (ref 136–145)
WBC # BLD AUTO: 9.26 K/UL (ref 3.9–12.7)

## 2023-01-01 PROCEDURE — 11000001 HC ACUTE MED/SURG PRIVATE ROOM

## 2023-01-01 PROCEDURE — 94761 N-INVAS EAR/PLS OXIMETRY MLT: CPT

## 2023-01-01 PROCEDURE — 80053 COMPREHEN METABOLIC PANEL: CPT | Performed by: STUDENT IN AN ORGANIZED HEALTH CARE EDUCATION/TRAINING PROGRAM

## 2023-01-01 PROCEDURE — 85025 COMPLETE CBC W/AUTO DIFF WBC: CPT | Performed by: STUDENT IN AN ORGANIZED HEALTH CARE EDUCATION/TRAINING PROGRAM

## 2023-01-01 PROCEDURE — 36415 COLL VENOUS BLD VENIPUNCTURE: CPT | Performed by: STUDENT IN AN ORGANIZED HEALTH CARE EDUCATION/TRAINING PROGRAM

## 2023-01-01 PROCEDURE — 25000003 PHARM REV CODE 250

## 2023-01-01 PROCEDURE — 97530 THERAPEUTIC ACTIVITIES: CPT

## 2023-01-01 PROCEDURE — 63600175 PHARM REV CODE 636 W HCPCS: Performed by: STUDENT IN AN ORGANIZED HEALTH CARE EDUCATION/TRAINING PROGRAM

## 2023-01-01 PROCEDURE — 25000003 PHARM REV CODE 250: Performed by: STUDENT IN AN ORGANIZED HEALTH CARE EDUCATION/TRAINING PROGRAM

## 2023-01-01 RX ORDER — RISPERIDONE 0.5 MG/1
0.5 TABLET, ORALLY DISINTEGRATING ORAL 2 TIMES DAILY
Status: DISCONTINUED | OUTPATIENT
Start: 2023-01-01 | End: 2023-01-04 | Stop reason: HOSPADM

## 2023-01-01 RX ADMIN — SERTRALINE HYDROCHLORIDE 25 MG: 25 TABLET ORAL at 11:01

## 2023-01-01 RX ADMIN — RISPERIDONE 0.5 MG: 0.5 TABLET, ORALLY DISINTEGRATING ORAL at 09:01

## 2023-01-01 RX ADMIN — FOLIC ACID 1 MG: 1 TABLET ORAL at 11:01

## 2023-01-01 RX ADMIN — ATORVASTATIN CALCIUM 40 MG: 40 TABLET, FILM COATED ORAL at 11:01

## 2023-01-01 RX ADMIN — DIVALPROEX SODIUM 250 MG: 250 TABLET, DELAYED RELEASE ORAL at 05:01

## 2023-01-01 RX ADMIN — HYDROCHLOROTHIAZIDE 12.5 MG: 12.5 TABLET ORAL at 11:01

## 2023-01-01 RX ADMIN — THIAMINE HYDROCHLORIDE 500 MG: 100 INJECTION, SOLUTION INTRAMUSCULAR; INTRAVENOUS at 03:01

## 2023-01-01 RX ADMIN — INSULIN ASPART 2 UNITS: 100 INJECTION, SOLUTION INTRAVENOUS; SUBCUTANEOUS at 06:01

## 2023-01-01 RX ADMIN — DIVALPROEX SODIUM 250 MG: 250 TABLET, DELAYED RELEASE ORAL at 01:01

## 2023-01-01 RX ADMIN — DIVALPROEX SODIUM 250 MG: 250 TABLET, DELAYED RELEASE ORAL at 09:01

## 2023-01-01 RX ADMIN — THERA TABS 1 TABLET: TAB at 11:01

## 2023-01-01 RX ADMIN — ENOXAPARIN SODIUM 40 MG: 40 INJECTION SUBCUTANEOUS at 05:01

## 2023-01-01 RX ADMIN — THIAMINE HYDROCHLORIDE 500 MG: 100 INJECTION, SOLUTION INTRAMUSCULAR; INTRAVENOUS at 11:01

## 2023-01-01 RX ADMIN — THIAMINE HYDROCHLORIDE 500 MG: 100 INJECTION, SOLUTION INTRAMUSCULAR; INTRAVENOUS at 09:01

## 2023-01-01 RX ADMIN — AMLODIPINE BESYLATE 10 MG: 5 TABLET ORAL at 11:01

## 2023-01-01 RX ADMIN — RISPERIDONE 0.5 MG: 0.5 TABLET, ORALLY DISINTEGRATING ORAL at 11:01

## 2023-01-01 RX ADMIN — INSULIN ASPART 1 UNITS: 100 INJECTION, SOLUTION INTRAVENOUS; SUBCUTANEOUS at 09:01

## 2023-01-01 NOTE — PLAN OF CARE
Problem: Adult Inpatient Plan of Care  Goal: Plan of Care Review  Outcome: Ongoing, Progressing  Goal: Patient-Specific Goal (Individualized)  Outcome: Ongoing, Progressing  Goal: Absence of Hospital-Acquired Illness or Injury  Outcome: Ongoing, Progressing  Goal: Optimal Comfort and Wellbeing  Outcome: Ongoing, Progressing  Goal: Readiness for Transition of Care  Outcome: Ongoing, Progressing     Problem: Skin Injury Risk Increased  Goal: Skin Health and Integrity  Outcome: Ongoing, Progressing     Problem: Hypertension Comorbidity  Goal: Blood Pressure in Desired Range  Outcome: Ongoing, Progressing     Problem: Behavior Regulation Impairment (Dementia Signs/Symptoms)  Goal: Improved Behavioral Control (Dementia Signs/Symptoms)  Outcome: Ongoing, Progressing     Problem: Cognitive Impairment (Dementia Signs/Symptoms)  Goal: Optimized Cognitive Function (Dementia Signs/Symptoms)  Outcome: Ongoing, Progressing     Problem: Mood Impairment (Dementia Signs/Symptoms)  Goal: Improved Mood Symptoms (Dementia Signs/Symptoms)  Outcome: Ongoing, Progressing     Problem: Chest Pain  Goal: Resolution of Chest Pain Symptoms  Outcome: Ongoing, Progressing     Problem: Fall Injury Risk  Goal: Absence of Fall and Fall-Related Injury  Outcome: Ongoing, Progressing     Problem: Restraint, Nonbehavioral (Nonviolent)  Goal: Absence of Harm or Injury  Outcome: Ongoing, Progressing

## 2023-01-01 NOTE — PLAN OF CARE
Problem: Occupational Therapy  Goal: Occupational Therapy Goal  Description: Goals to be met by: 1/31/2023     Patient will increase functional independence with ADLs by performing:    Toileting from bedside commode with Minimal Assistance for hygiene and clothing management.   Step transfer with Minimal Assistance & appropriate AD.  Toilet transfer to bedside commode with Minimal Assistance & appropriate AD.    Outcome: Ongoing, Not Progressing     Pt was agreeable to OT but unable to actively participate in session.  Pt unable to follow instructions and confused re: reason for therapy visit.  Pt with significant cognitive limitations with recommendations for 24/7 level of care at discharge due to lack of safety awareness and increased risk for falls/injury because of this.  Discharge from OT services at this time as pt is unable to actively participate in sessions.       Aishwarya Johnson, OT  1/1/2023

## 2023-01-01 NOTE — PT/OT/SLP PROGRESS
Occupational Therapy   Treatment / Discharge    Name: Raymundo Oneal  MRN: [id number]  Admitting Diagnosis:  Moderate vascular dementia with agitation       Recommendations:     Discharge Recommendations:  (requires 24/7 level of care at discharge)  Discharge Equipment Recommendations:   (TBD)  Barriers to discharge:  Decreased caregiver support (requires significant level of care / assistance at discharge due to cognitive and safety challenges)    Assessment:     Raymundo Oneal is a 77 y.o. male with a medical diagnosis of Moderate vascular dementia with agitation.  He presents with the following performance deficits affecting function: weakness, impaired endurance, impaired sensation, impaired self care skills, impaired functional mobility, gait instability, impaired balance, impaired cognition, decreased coordination, decreased upper extremity function, decreased lower extremity function, decreased safety awareness, impaired skin. Pt was agreeable to OT but unable to actively participate in session.  Pt unable to follow instructions and confused re: reason for therapy visit.  Pt with significant cognitive limitations with recommendations for 24/7 level of care at discharge due to lack of safety awareness and increased risk for falls/injury because of this.  Discharge from OT services at this time as pt is unable to actively participate in sessions.         Rehab Prognosis:  Poor; patient would benefit from acute skilled OT services to address these deficits and reach maximum level of function.       Plan:     Patient to be seen 2 x/week to address the above listed problems via self-care/home management, therapeutic activities, therapeutic exercises  Plan of Care Expires: 01/31/23  Plan of Care Reviewed with: patient    Subjective     Pain/Comfort:  Pain Rating 1: 0/10  Pain Rating Post-Intervention 1: 0/10    Objective:     Communicated with: nurse prior to session.  Patient found HOB elevated with telemetry, bed  alarm, restraints ([employer] camera) upon OT entry to room.    General Precautions: Standard, hearing impaired, fall    Orthopedic Precautions:N/A  Braces: N/A  Respiratory Status: Room air     Occupational Performance:     Bed Mobility:    Patient completed Scooting/Bridging with max assistance - pt instructed to scoot to HOB to eat breakfast - pt agreeable but unable to coordinate UEs/LEs for task - OT assisted, but pt initially resistive - required max A using draw sheet and bed in trendelenburg to scoot to HOB      Functional Mobility/Transfers:  Not attempted due to pt's confusion for safety      Activities of Daily Living:  Feeding:  maximal assistance - OT attempted to assist pt with breakfast meal, but he stated that he was not hungry - pt accepted drink, but would not drink from cup - in attempting to bring to mouth, pt dropped straw and began laughing - [place] provided to bring cup to mouth for pt to take a few sips of drink  Grooming: maximal assistance pt handed wash cloth while supine in bed - held onto wash cloth and agreed to wash face, but did not initiate task - OT wiped pt's forehead and asked him to finish - pt mimicked wiping forehead without making contact to face - assistance to complete grooming task required      [facility] 6 Click ADL: 9    Treatment & Education:  OT attempted to work on ADLs (grooming and eating) as well as func mobility (bed mobility), but pt required increased physical assistance with both tasks due to confusion and difficulty following instructions  OOB activities (ambulation and/or transfers) not attempted due to pt's decreased safety/environmental awareness which placed him at risk for falls  Pt educated on role of OT and POC      Patient left HOB elevated with call button in reach and bed alarm on    GOALS:   Multidisciplinary Problems       Occupational Therapy Goals          Problem: Occupational Therapy    Goal Priority Disciplines Outcome Interventions   Occupational Therapy  Goal     OT, PT/OT Ongoing, Not Progressing    Description: Goals to be met by: 1/31/2023     Patient will increase functional independence with ADLs by performing:    Toileting from bedside commode with Minimal Assistance for hygiene and clothing management.   Step transfer with Minimal Assistance & appropriate AD.  Toilet transfer to bedside commode with Minimal Assistance & appropriate AD.                         Time Tracking:     OT Date of Treatment: 01/01/23  OT Start Time: 0954  OT Stop Time: 1005  OT Total Time (min): 11 min    Billable Minutes:Therapeutic Activity 11    OT/SACHA: OT     SACHA Visit Number: 0    1/1/2023
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
## 2023-01-02 LAB
ALBUMIN SERPL BCP-MCNC: 3.3 G/DL (ref 3.5–5.2)
ALP SERPL-CCNC: 45 U/L (ref 55–135)
ALT SERPL W/O P-5'-P-CCNC: 12 U/L (ref 10–44)
ANION GAP SERPL CALC-SCNC: 12 MMOL/L (ref 8–16)
AST SERPL-CCNC: 14 U/L (ref 10–40)
BASOPHILS # BLD AUTO: 0.04 K/UL (ref 0–0.2)
BASOPHILS NFR BLD: 0.5 % (ref 0–1.9)
BILIRUB SERPL-MCNC: 1.5 MG/DL (ref 0.1–1)
BUN SERPL-MCNC: 32 MG/DL (ref 8–23)
CALCIUM SERPL-MCNC: 9.3 MG/DL (ref 8.7–10.5)
CHLORIDE SERPL-SCNC: 97 MMOL/L (ref 95–110)
CO2 SERPL-SCNC: 25 MMOL/L (ref 23–29)
CREAT SERPL-MCNC: 1.2 MG/DL (ref 0.5–1.4)
DIFFERENTIAL METHOD: ABNORMAL
EOSINOPHIL # BLD AUTO: 0.2 K/UL (ref 0–0.5)
EOSINOPHIL NFR BLD: 2.1 % (ref 0–8)
ERYTHROCYTE [DISTWIDTH] IN BLOOD BY AUTOMATED COUNT: 12.4 % (ref 11.5–14.5)
EST. GFR  (NO RACE VARIABLE): >60 ML/MIN/1.73 M^2
GLUCOSE SERPL-MCNC: 176 MG/DL (ref 70–110)
HCT VFR BLD AUTO: 37.9 % (ref 40–54)
HGB BLD-MCNC: 13.2 G/DL (ref 14–18)
IMM GRANULOCYTES # BLD AUTO: 0.03 K/UL (ref 0–0.04)
IMM GRANULOCYTES NFR BLD AUTO: 0.4 % (ref 0–0.5)
LYMPHOCYTES # BLD AUTO: 1.4 K/UL (ref 1–4.8)
LYMPHOCYTES NFR BLD: 18.7 % (ref 18–48)
MCH RBC QN AUTO: 30.5 PG (ref 27–31)
MCHC RBC AUTO-ENTMCNC: 34.8 G/DL (ref 32–36)
MCV RBC AUTO: 88 FL (ref 82–98)
MONOCYTES # BLD AUTO: 1.1 K/UL (ref 0.3–1)
MONOCYTES NFR BLD: 14.3 % (ref 4–15)
NEUTROPHILS # BLD AUTO: 4.8 K/UL (ref 1.8–7.7)
NEUTROPHILS NFR BLD: 64 % (ref 38–73)
NRBC BLD-RTO: 0 /100 WBC
PLATELET # BLD AUTO: 173 K/UL (ref 150–450)
PMV BLD AUTO: 12.4 FL (ref 9.2–12.9)
POCT GLUCOSE: 177 MG/DL (ref 70–110)
POCT GLUCOSE: 179 MG/DL (ref 70–110)
POCT GLUCOSE: 180 MG/DL (ref 70–110)
POCT GLUCOSE: 214 MG/DL (ref 70–110)
POTASSIUM SERPL-SCNC: 3.6 MMOL/L (ref 3.5–5.1)
PROT SERPL-MCNC: 6.8 G/DL (ref 6–8.4)
RBC # BLD AUTO: 4.33 M/UL (ref 4.6–6.2)
SODIUM SERPL-SCNC: 134 MMOL/L (ref 136–145)
WBC # BLD AUTO: 7.54 K/UL (ref 3.9–12.7)

## 2023-01-02 PROCEDURE — 94761 N-INVAS EAR/PLS OXIMETRY MLT: CPT

## 2023-01-02 PROCEDURE — 80053 COMPREHEN METABOLIC PANEL: CPT | Performed by: STUDENT IN AN ORGANIZED HEALTH CARE EDUCATION/TRAINING PROGRAM

## 2023-01-02 PROCEDURE — 25000003 PHARM REV CODE 250: Performed by: STUDENT IN AN ORGANIZED HEALTH CARE EDUCATION/TRAINING PROGRAM

## 2023-01-02 PROCEDURE — 25000003 PHARM REV CODE 250

## 2023-01-02 PROCEDURE — 63600175 PHARM REV CODE 636 W HCPCS: Performed by: STUDENT IN AN ORGANIZED HEALTH CARE EDUCATION/TRAINING PROGRAM

## 2023-01-02 PROCEDURE — 11000001 HC ACUTE MED/SURG PRIVATE ROOM

## 2023-01-02 PROCEDURE — 85025 COMPLETE CBC W/AUTO DIFF WBC: CPT | Performed by: STUDENT IN AN ORGANIZED HEALTH CARE EDUCATION/TRAINING PROGRAM

## 2023-01-02 PROCEDURE — 36415 COLL VENOUS BLD VENIPUNCTURE: CPT | Performed by: STUDENT IN AN ORGANIZED HEALTH CARE EDUCATION/TRAINING PROGRAM

## 2023-01-02 RX ADMIN — DIVALPROEX SODIUM 250 MG: 250 TABLET, DELAYED RELEASE ORAL at 04:01

## 2023-01-02 RX ADMIN — ENOXAPARIN SODIUM 40 MG: 40 INJECTION SUBCUTANEOUS at 04:01

## 2023-01-02 RX ADMIN — SERTRALINE HYDROCHLORIDE 25 MG: 25 TABLET ORAL at 09:01

## 2023-01-02 RX ADMIN — DIVALPROEX SODIUM 250 MG: 250 TABLET, DELAYED RELEASE ORAL at 09:01

## 2023-01-02 RX ADMIN — ATORVASTATIN CALCIUM 40 MG: 40 TABLET, FILM COATED ORAL at 09:01

## 2023-01-02 RX ADMIN — THERA TABS 1 TABLET: TAB at 09:01

## 2023-01-02 RX ADMIN — THIAMINE HYDROCHLORIDE 500 MG: 100 INJECTION, SOLUTION INTRAMUSCULAR; INTRAVENOUS at 09:01

## 2023-01-02 RX ADMIN — RISPERIDONE 0.5 MG: 0.5 TABLET, ORALLY DISINTEGRATING ORAL at 09:01

## 2023-01-02 RX ADMIN — DIVALPROEX SODIUM 250 MG: 250 TABLET, DELAYED RELEASE ORAL at 06:01

## 2023-01-02 RX ADMIN — FOLIC ACID 1 MG: 1 TABLET ORAL at 09:01

## 2023-01-02 RX ADMIN — AMLODIPINE BESYLATE 10 MG: 5 TABLET ORAL at 09:01

## 2023-01-02 RX ADMIN — THIAMINE HYDROCHLORIDE 500 MG: 100 INJECTION, SOLUTION INTRAMUSCULAR; INTRAVENOUS at 04:01

## 2023-01-02 RX ADMIN — HYDROCHLOROTHIAZIDE 12.5 MG: 12.5 TABLET ORAL at 09:01
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
## 2023-01-02 NOTE — PLAN OF CARE
Problem: Adult Inpatient Plan of Care  Goal: Plan of Care Review  Outcome: Ongoing, Progressing  Goal: Patient-Specific Goal (Individualized)  Outcome: Ongoing, Progressing  Goal: Absence of Hospital-Acquired Illness or Injury  Outcome: Ongoing, Progressing  Goal: Optimal Comfort and Wellbeing  Outcome: Ongoing, Progressing  Goal: Readiness for Transition of Care  Outcome: Ongoing, Progressing     Problem: Skin Injury Risk Increased  Goal: Skin Health and Integrity  Outcome: Ongoing, Progressing     Problem: Hypertension Comorbidity  Goal: Blood Pressure in Desired Range  Outcome: Ongoing, Progressing     Problem: Cognitive Impairment (Dementia Signs/Symptoms)  Goal: Optimized Cognitive Function (Dementia Signs/Symptoms)  Outcome: Ongoing, Progressing     Problem: Mood Impairment (Dementia Signs/Symptoms)  Goal: Improved Mood Symptoms (Dementia Signs/Symptoms)  Outcome: Ongoing, Progressing     Problem: Chest Pain  Goal: Resolution of Chest Pain Symptoms  Outcome: Ongoing, Progressing     Problem: Fall Injury Risk  Goal: Absence of Fall and Fall-Related Injury  Outcome: Ongoing, Progressing     Problem: Restraint, Nonbehavioral (Nonviolent)  Goal: Absence of Harm or Injury  Outcome: Ongoing, Progressing

## 2023-01-02 NOTE — PLAN OF CARE
[employer] 2 for V Wave not available via pharmacy benefits. Application started via [facility] to process under medical insurance benefit. VN Note: Labs, notes, orders, care plan review will continue to monitor and be available as needed.   Problem: Adult Inpatient Plan of Care  Goal: Plan of Care Review  Outcome: Ongoing, Progressing

## 2023-01-02 NOTE — PLAN OF CARE
SW received call from nurses station. Pt daughter Leona [phone] requesting SW call.    [facility]. Pt noted to be accepted to the following facilities:  [facility] Phone: [phone]  -   [facility] Phone: [phone]    [facility] Phone: [phone]      All facilities admission departments are closed today.  RAJESH spoke with pt daughter Leona who wanted to know process of placement, if pt was able to be placed in [place], and if Geripsych placement was started. RAJESH informed pt daughter that assigned  does not work weekends and today office is closed due to Holiday. RAJESH provided supervisor phone number as well./ Pt daughter requesting staff call Tuesday to inform of placement status. Family understanding of office closures and facility process however reports being anxious due to having to return to work in [place] herself.         SAHARA [facility] Case Management  [phone]             01/02/23 1250   Post-Acute Status   Post-Acute Authorization Placement   Post-Acute Placement Status Referrals Sent   Discharge Delays None known at this time   Discharge Plan   Discharge Plan A Skilled Nursing Facility;New Nursing Home placement - [facility] care facility

## 2023-01-02 NOTE — PLAN OF CARE
VN note: Patient chart, labs, and vitals reviewed. VN to continue to be available as needed.     Problem: Adult Inpatient Plan of Care  Goal: Plan of Care Review  Outcome: Ongoing, Progressing      Wound Care (No Sutures): Petrolatum

## 2023-01-02 NOTE — PLAN OF CARE
Purposeful rounding done this shift. Pt shows no acute distress. NSR on monitor. Bed alarm on for pt safety.

## 2023-01-03 LAB
ALBUMIN SERPL BCP-MCNC: 3.3 G/DL (ref 3.5–5.2)
ALP SERPL-CCNC: 47 U/L (ref 55–135)
ALT SERPL W/O P-5'-P-CCNC: 13 U/L (ref 10–44)
ANION GAP SERPL CALC-SCNC: 12 MMOL/L (ref 8–16)
AST SERPL-CCNC: 16 U/L (ref 10–40)
BASOPHILS # BLD AUTO: 0.04 K/UL (ref 0–0.2)
BASOPHILS NFR BLD: 0.5 % (ref 0–1.9)
BILIRUB SERPL-MCNC: 1.2 MG/DL (ref 0.1–1)
BUN SERPL-MCNC: 32 MG/DL (ref 8–23)
CALCIUM SERPL-MCNC: 9.3 MG/DL (ref 8.7–10.5)
CHLORIDE SERPL-SCNC: 97 MMOL/L (ref 95–110)
CO2 SERPL-SCNC: 23 MMOL/L (ref 23–29)
CREAT SERPL-MCNC: 1.1 MG/DL (ref 0.5–1.4)
DIFFERENTIAL METHOD: ABNORMAL
EOSINOPHIL # BLD AUTO: 0.2 K/UL (ref 0–0.5)
EOSINOPHIL NFR BLD: 2.2 % (ref 0–8)
ERYTHROCYTE [DISTWIDTH] IN BLOOD BY AUTOMATED COUNT: 12.5 % (ref 11.5–14.5)
EST. GFR  (NO RACE VARIABLE): >60 ML/MIN/1.73 M^2
GLUCOSE SERPL-MCNC: 168 MG/DL (ref 70–110)
HCT VFR BLD AUTO: 40.5 % (ref 40–54)
HGB BLD-MCNC: 13.8 G/DL (ref 14–18)
IMM GRANULOCYTES # BLD AUTO: 0.02 K/UL (ref 0–0.04)
IMM GRANULOCYTES NFR BLD AUTO: 0.3 % (ref 0–0.5)
LYMPHOCYTES # BLD AUTO: 1.5 K/UL (ref 1–4.8)
LYMPHOCYTES NFR BLD: 18.7 % (ref 18–48)
MCH RBC QN AUTO: 30.5 PG (ref 27–31)
MCHC RBC AUTO-ENTMCNC: 34.1 G/DL (ref 32–36)
MCV RBC AUTO: 90 FL (ref 82–98)
MONOCYTES # BLD AUTO: 1.1 K/UL (ref 0.3–1)
MONOCYTES NFR BLD: 14 % (ref 4–15)
NEUTROPHILS # BLD AUTO: 5 K/UL (ref 1.8–7.7)
NEUTROPHILS NFR BLD: 64.3 % (ref 38–73)
NRBC BLD-RTO: 0 /100 WBC
PLATELET # BLD AUTO: 170 K/UL (ref 150–450)
PMV BLD AUTO: 12.9 FL (ref 9.2–12.9)
POCT GLUCOSE: 160 MG/DL (ref 70–110)
POCT GLUCOSE: 177 MG/DL (ref 70–110)
POCT GLUCOSE: 218 MG/DL (ref 70–110)
POTASSIUM SERPL-SCNC: 3.6 MMOL/L (ref 3.5–5.1)
PROT SERPL-MCNC: 6.8 G/DL (ref 6–8.4)
RBC # BLD AUTO: 4.52 M/UL (ref 4.6–6.2)
SARS-COV-2 RDRP RESP QL NAA+PROBE: NEGATIVE
SODIUM SERPL-SCNC: 132 MMOL/L (ref 136–145)
VIT B1 BLD-MCNC: 59 UG/L (ref 38–122)
WBC # BLD AUTO: 7.81 K/UL (ref 3.9–12.7)

## 2023-01-03 PROCEDURE — 11000001 HC ACUTE MED/SURG PRIVATE ROOM

## 2023-01-03 PROCEDURE — 85025 COMPLETE CBC W/AUTO DIFF WBC: CPT | Performed by: STUDENT IN AN ORGANIZED HEALTH CARE EDUCATION/TRAINING PROGRAM

## 2023-01-03 PROCEDURE — 80053 COMPREHEN METABOLIC PANEL: CPT | Performed by: STUDENT IN AN ORGANIZED HEALTH CARE EDUCATION/TRAINING PROGRAM

## 2023-01-03 PROCEDURE — 36415 COLL VENOUS BLD VENIPUNCTURE: CPT | Performed by: STUDENT IN AN ORGANIZED HEALTH CARE EDUCATION/TRAINING PROGRAM

## 2023-01-03 PROCEDURE — 99233 PR SUBSEQUENT HOSPITAL CARE,LEVL III: ICD-10-PCS | Mod: ,,, | Performed by: PSYCHIATRY & NEUROLOGY

## 2023-01-03 PROCEDURE — 25000003 PHARM REV CODE 250: Performed by: STUDENT IN AN ORGANIZED HEALTH CARE EDUCATION/TRAINING PROGRAM

## 2023-01-03 PROCEDURE — 97530 THERAPEUTIC ACTIVITIES: CPT | Mod: CQ

## 2023-01-03 PROCEDURE — 90833 PSYTX W PT W E/M 30 MIN: CPT | Mod: ,,, | Performed by: PSYCHIATRY & NEUROLOGY

## 2023-01-03 PROCEDURE — U0002 COVID-19 LAB TEST NON-CDC: HCPCS | Performed by: STUDENT IN AN ORGANIZED HEALTH CARE EDUCATION/TRAINING PROGRAM

## 2023-01-03 PROCEDURE — 25000003 PHARM REV CODE 250

## 2023-01-03 PROCEDURE — 99233 SBSQ HOSP IP/OBS HIGH 50: CPT | Mod: ,,, | Performed by: PSYCHIATRY & NEUROLOGY

## 2023-01-03 PROCEDURE — 90833 PR PSYCHOTHERAPY W/PATIENT W/E&M, 30 MIN (ADD ON): ICD-10-PCS | Mod: ,,, | Performed by: PSYCHIATRY & NEUROLOGY

## 2023-01-03 PROCEDURE — 63600175 PHARM REV CODE 636 W HCPCS: Performed by: STUDENT IN AN ORGANIZED HEALTH CARE EDUCATION/TRAINING PROGRAM

## 2023-01-03 PROCEDURE — 97116 GAIT TRAINING THERAPY: CPT | Mod: CQ

## 2023-01-03 RX ORDER — RISPERIDONE 0.5 MG/1
0.5 TABLET, ORALLY DISINTEGRATING ORAL 2 TIMES DAILY
Qty: 60 TABLET | Refills: 11 | Status: SHIPPED | OUTPATIENT
Start: 2023-01-03 | End: 2024-01-03

## 2023-01-03 RX ADMIN — RISPERIDONE 0.5 MG: 0.5 TABLET, ORALLY DISINTEGRATING ORAL at 10:01

## 2023-01-03 RX ADMIN — INSULIN ASPART 1 UNITS: 100 INJECTION, SOLUTION INTRAVENOUS; SUBCUTANEOUS at 09:01

## 2023-01-03 RX ADMIN — AMLODIPINE BESYLATE 10 MG: 5 TABLET ORAL at 10:01

## 2023-01-03 RX ADMIN — RISPERIDONE 0.5 MG: 0.5 TABLET, ORALLY DISINTEGRATING ORAL at 09:01

## 2023-01-03 RX ADMIN — DIVALPROEX SODIUM 250 MG: 250 TABLET, DELAYED RELEASE ORAL at 10:01

## 2023-01-03 RX ADMIN — DIVALPROEX SODIUM 250 MG: 250 TABLET, DELAYED RELEASE ORAL at 09:01

## 2023-01-03 RX ADMIN — HYDROCHLOROTHIAZIDE 12.5 MG: 12.5 TABLET ORAL at 10:01

## 2023-01-03 RX ADMIN — FOLIC ACID 1 MG: 1 TABLET ORAL at 10:01

## 2023-01-03 RX ADMIN — THIAMINE HYDROCHLORIDE 500 MG: 100 INJECTION, SOLUTION INTRAMUSCULAR; INTRAVENOUS at 10:01

## 2023-01-03 RX ADMIN — SERTRALINE HYDROCHLORIDE 25 MG: 25 TABLET ORAL at 10:01

## 2023-01-03 RX ADMIN — ATORVASTATIN CALCIUM 40 MG: 40 TABLET, FILM COATED ORAL at 10:01

## 2023-01-03 RX ADMIN — THERA TABS 1 TABLET: TAB at 10:01

## 2023-01-03 NOTE — PLAN OF CARE
SW spoke with pts daughter to discuss dc planning. SW expressed that our Centralized placement staff have sent referrals for [facility] placement. SW expressed at this time we are awaiting placement. SW expressed that they will select agency with first available bed. Pts daughter expressed understanding.     Transport packet is at nursing station. In transport packet there is PEC, Facesheet, and Facility transfer orders. Nursing staff updated to review chart review/encounter for report/room number.     SW will continue to follow pt throughout his transitions of care and assist with any dc needs.     Please note as of 4:18 pm no [facility] facility has been selected. Pt is still pending placement at this time. Pts daughter has been notified.     Cleared from CM . Bedside Nurse and VN notified.       01/03/23 9169   Final Note   Assessment Type Final Discharge Note   Anticipated Discharge Disposition Psych   Hospital Resources/Appts/Education Provided Appointments scheduled by Navigator/Coordinator   Post-Acute Status   Discharge Delays None known at this time

## 2023-01-03 NOTE — PLAN OF CARE
Problem: Adult Inpatient Plan of Care  Goal: Plan of Care Review  Outcome: Ongoing, Progressing  Goal: Patient-Specific Goal (Individualized)  Outcome: Ongoing, Progressing  Goal: Absence of Hospital-Acquired Illness or Injury  Outcome: Ongoing, Progressing  Goal: Optimal Comfort and Wellbeing  Outcome: Ongoing, Progressing  Goal: Readiness for Transition of Care  Outcome: Ongoing, Progressing     Problem: Skin Injury Risk Increased  Goal: Skin Health and Integrity  Outcome: Ongoing, Progressing     Problem: Behavior Regulation Impairment (Dementia Signs/Symptoms)  Goal: Improved Behavioral Control (Dementia Signs/Symptoms)  Outcome: Ongoing, Progressing     Problem: Cognitive Impairment (Dementia Signs/Symptoms)  Goal: Optimized Cognitive Function (Dementia Signs/Symptoms)  Outcome: Ongoing, Progressing     Problem: Mood Impairment (Dementia Signs/Symptoms)  Goal: Improved Mood Symptoms (Dementia Signs/Symptoms)  Outcome: Ongoing, Progressing     Problem: Fall Injury Risk  Goal: Absence of Fall and Fall-Related Injury  Outcome: Ongoing, Progressing     Pt was calm while asleep throughout the night but once awake he became very uncooperative. Pt repeatedly removed Telebox, continuously threw blankets on the floor, would not keep his gown or brief on. Pt also had moments of aggressive behavior where he began to hit and spit his medication out. Sat by pt's door throughout shift to ensure pt remained in bed.
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
## 2023-01-03 NOTE — PLAN OF CARE
[facility] Plan of Care    Patient seen and evaluated by myself this morning.  Vitals stable.  Patient oriented to self only with intermittent delusions but otherwise unremarkable.  All labs, imaging, and other necessary tests reviewed this morning and are unremarkable.  He is medically ready for discharge to geriatric psych facility.    Lalit Sabillon MD  [facility], PGY2

## 2023-01-03 NOTE — NURSING
Dr. Gracia messaged regarding daughter's concerns in change in LOC talking about death and dark cenarios, night nurse stated patient has been refusing and spitting out PO meds last 24 hours. Patient refused seizure med at 6 a.m. spoke with pharmacy to reschedule med.

## 2023-01-03 NOTE — PT/OT/SLP PROGRESS
Physical Therapy Treatment    Patient Name:  Raymundo Oneal   MRN:  [id number]    Recommendations:     Discharge Recommendations:  (will require 24/7 care)  Discharge Equipment Recommendations:  (TBD at next level of care)  Barriers to discharge: Decreased caregiver support    Assessment:     Raymundo Oneal is a 77 y.o. male admitted with a medical diagnosis of Moderate vascular dementia with agitation.  He presents with the following impairments/functional limitations: weakness, impaired endurance, impaired self care skills, impaired functional mobility, gait instability, impaired balance, impaired cognition, decreased coordination, decreased upper extremity function, decreased lower extremity function, decreased safety awareness, impaired coordination ;pt with improved mobility today, able to amb. Into hallway w/ HHA/min.A mostly, occasional modA for balance..    Rehab Prognosis: Fair; patient would benefit from acute skilled PT services to address these deficits and reach maximum level of function.    Recent Surgery: * No surgery found *      Plan:     During this hospitalization, patient to be seen 3 x/week to address the identified rehab impairments via gait training, therapeutic activities, therapeutic exercises, neuromuscular re-education and progress toward the following goals:    Plan of Care Expires:  01/28/23    Subjective     Chief Complaint: none stated  Patient/Family Comments/goals: pt agreeable to session, daughter present as well.  Pain/Comfort:  Pain Rating 1: 0/10  Pain Rating Post-Intervention 1: 0/10      Objective:     Communicated with nurse prior to session.  Patient found HOB elevated with peripheral IV ([employer] monitoring) upon PT entry to room.     General Precautions: Standard, fall, hearing impaired, diabetic, seizure  Orthopedic Precautions: N/A  Braces: N/A  Respiratory Status: Room air     Functional Mobility:  Bed Mobility:     Supine to Sit: minimum assistance  Sit to Supine: minimum  assistance  Transfers:     Sit to Stand:  minimum assistance with rolling walker  Gait: pt amb'd w/ RW ~10' w/ min/modA for guidance, pt needing lots of cueing. Pt then amb'd ~60' w/ HHA/[place] in hallway, pt using railing on wall for assistance, needing lots of cueing to return to room.       AM-PAC 6 CLICK MOBILITY  Turning over in bed (including adjusting bedclothes, sheets and blankets)?: 4  Sitting down on and standing up from a chair with arms (e.g., wheelchair, bedside commode, etc.): 3  Moving from lying on back to sitting on the side of the bed?: 3  Moving to and from a bed to a chair (including a wheelchair)?: 3  Need to walk in hospital room?: 2  Climbing 3-5 steps with a railing?: 2  Basic Mobility Total Score: 17       Treatment & Education:  Pt quite fatigued upon returning to room from ambulating in hallway, standing for a long time. No ex's perf'd at this time, pt fell asleep right away.     Patient left HOB elevated with all lines intact, call button in reach, bed alarm on, nurse notified, and family and [employer] monitoring present..    GOALS:   Multidisciplinary Problems       Physical Therapy Goals          Problem: Physical Therapy    Goal Priority Disciplines Outcome Goal Variances Interventions   Physical Therapy Goal     PT, PT/OT Ongoing, Progressing     Description: Goals to be met by: discharge date     Patient will increase functional independence with mobility by performin. Sit to stand transfer with Modified [place] demonstrating good safety w technique.  2. Gait  x 150 feet with Stand-by Assistance using Rolling Walker demonstrating safe technique w AD use.  3.  Gait x 50 feet with SBA without AD use without demo Loss of Balance during straight path and change in direction.                            Time Tracking:     PT Received On: 23  PT Start Time: 1430     PT Stop Time: 1458  PT Total Time (min): 28 min     Billable Minutes: Gait Training 18 and Therapeutic Activity  10    Treatment Type: Treatment  PT/PTA: PTA     PTA Visit Number: 2     01/03/2023
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
## 2023-01-03 NOTE — PLAN OF CARE
[facility] faxed to [phone].  [facility] order placed for Centralized Psych Placement.       01/03/23 1323   Post-Acute Status   Post-Acute Authorization Placement   Post-Acute Placement Status Referrals Sent       Jose Eduardo Ghotra RN   Supervisor Case Management-[facility]  [phone]

## 2023-01-03 NOTE — PLAN OF CARE
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       Diet: regular diet    Activities: Up with assistance    Goals of Care Treatment Preferences:         Nursing: none    Labs: none      CONSULTS:    none    MISCELLANEOUS CARE:  Routine Skin for Bedridden Patients: Apply moisture barrier cream to all skin folds and wet areas in perineal area daily and after baths and all bowel movements.    WOUND CARE ORDERS  none    Medications: Review discharge medications with patient and family and provide education.      Current Discharge Medication List        START taking these medications    Details   amLODIPine (NORVASC) 10 MG tablet Take 1 tablet (10 mg total) by mouth once daily.  Qty: 30 tablet, Refills: 11    Comments: .      atorvastatin (LIPITOR) 40 MG tablet Take 1 tablet (40 mg total) by mouth once daily.  Qty: 90 tablet, Refills: 3      !! cyanocobalamin 1,000 mcg/mL injection Inject 1 mL (1,000 mcg total) into the muscle once a week. for 3 doses  Qty: 1 mL, Refills: 2      !! cyanocobalamin 1,000 mcg/mL injection Inject 1 mL (1,000 mcg total) into the muscle every 30 days.  Qty: 1 mL, Refills: 3      !! dextrose 5 % SolP 100 mL with  thiamine 100 mg/mL Soln 250 mg Inject 250 mg into the vein once daily. for 5 days  Qty: 1 vial, Refills: 3      !! dextrose 5 % SolP 100 mL with thiamine 100 mg/mL Soln 500 mg Inject 500 mg into the vein 3 (three) times daily. for 4 days  Qty: 1 vial, Refills: 0      divalproex (DEPAKOTE) 250 MG EC tablet Take 1 tablet (250 mg total) by mouth every 8 (eight) hours.  Qty: 90 tablet, Refills: 11      folic acid (FOLVITE) 1 MG tablet Take 1 tablet (1 mg total) by mouth once daily.  Qty: 30 tablet, Refills: 3      hydroCHLOROthiazide (HYDRODIURIL) 12.5 MG Tab Take 1 tablet (12.5 mg total) by mouth once daily.  Qty: 30 tablet, Refills: 11    Comments: .      multivitamin Tab Take 1 tablet by mouth once daily.  Qty: 30 tablet, Refills: 3      sertraline (ZOLOFT) 25 MG tablet Take 1 tablet (25 mg total) by mouth once daily.  Qty: 30 tablet, Refills: 11       !! - Potential duplicate medications found. Please discuss with provider.        CONTINUE these medications which have NOT CHANGED    Details   loratadine (CLARITIN) 10 mg tablet Take 10 mg by mouth daily as needed.                Immunizations Administered as of 1/3/2023       No immunizations on file.            Has not received covid vaccination    Some patients may experience side effects after vaccination.  These may include fever, headache, muscle or joint aches.  Most symptoms resolve with 24-48 hours and do not require urgent medical evaluation unless they persist for more than 72 hours or symptoms are concerning for an unrelated medical condition.          _________________________________  Lalit Sabillon MD  01/03/2023

## 2023-01-04 VITALS
SYSTOLIC BLOOD PRESSURE: 142 MMHG | BODY MASS INDEX: 24.13 KG/M2 | WEIGHT: 159.19 LBS | TEMPERATURE: 96 F | HEART RATE: 85 BPM | RESPIRATION RATE: 17 BRPM | OXYGEN SATURATION: 98 % | HEIGHT: 68 IN | DIASTOLIC BLOOD PRESSURE: 63 MMHG

## 2023-01-04 LAB
ALBUMIN SERPL BCP-MCNC: 3.3 G/DL (ref 3.5–5.2)
ALP SERPL-CCNC: 47 U/L (ref 55–135)
ALT SERPL W/O P-5'-P-CCNC: 12 U/L (ref 10–44)
ANION GAP SERPL CALC-SCNC: 12 MMOL/L (ref 8–16)
AST SERPL-CCNC: 16 U/L (ref 10–40)
BACTERIA BLD CULT: NORMAL
BACTERIA BLD CULT: NORMAL
BASOPHILS # BLD AUTO: 0.04 K/UL (ref 0–0.2)
BASOPHILS NFR BLD: 0.5 % (ref 0–1.9)
BILIRUB SERPL-MCNC: 1.1 MG/DL (ref 0.1–1)
BUN SERPL-MCNC: 29 MG/DL (ref 8–23)
CALCIUM SERPL-MCNC: 9.3 MG/DL (ref 8.7–10.5)
CHLORIDE SERPL-SCNC: 97 MMOL/L (ref 95–110)
CO2 SERPL-SCNC: 24 MMOL/L (ref 23–29)
CREAT SERPL-MCNC: 1.1 MG/DL (ref 0.5–1.4)
DIFFERENTIAL METHOD: ABNORMAL
EOSINOPHIL # BLD AUTO: 0.1 K/UL (ref 0–0.5)
EOSINOPHIL NFR BLD: 1.6 % (ref 0–8)
ERYTHROCYTE [DISTWIDTH] IN BLOOD BY AUTOMATED COUNT: 12.2 % (ref 11.5–14.5)
EST. GFR  (NO RACE VARIABLE): >60 ML/MIN/1.73 M^2
GLUCOSE SERPL-MCNC: 164 MG/DL (ref 70–110)
HCT VFR BLD AUTO: 39.6 % (ref 40–54)
HGB BLD-MCNC: 13.8 G/DL (ref 14–18)
IMM GRANULOCYTES # BLD AUTO: 0.03 K/UL (ref 0–0.04)
IMM GRANULOCYTES NFR BLD AUTO: 0.4 % (ref 0–0.5)
LYMPHOCYTES # BLD AUTO: 1.4 K/UL (ref 1–4.8)
LYMPHOCYTES NFR BLD: 17.2 % (ref 18–48)
MCH RBC QN AUTO: 31.2 PG (ref 27–31)
MCHC RBC AUTO-ENTMCNC: 34.8 G/DL (ref 32–36)
MCV RBC AUTO: 89 FL (ref 82–98)
MONOCYTES # BLD AUTO: 1 K/UL (ref 0.3–1)
MONOCYTES NFR BLD: 12.5 % (ref 4–15)
NEUTROPHILS # BLD AUTO: 5.6 K/UL (ref 1.8–7.7)
NEUTROPHILS NFR BLD: 67.8 % (ref 38–73)
NRBC BLD-RTO: 0 /100 WBC
PLATELET # BLD AUTO: 173 K/UL (ref 150–450)
PMV BLD AUTO: 12.5 FL (ref 9.2–12.9)
POCT GLUCOSE: 172 MG/DL (ref 70–110)
POTASSIUM SERPL-SCNC: 3.8 MMOL/L (ref 3.5–5.1)
PROT SERPL-MCNC: 6.7 G/DL (ref 6–8.4)
RBC # BLD AUTO: 4.43 M/UL (ref 4.6–6.2)
SODIUM SERPL-SCNC: 133 MMOL/L (ref 136–145)
WBC # BLD AUTO: 8.31 K/UL (ref 3.9–12.7)

## 2023-01-04 PROCEDURE — 99233 SBSQ HOSP IP/OBS HIGH 50: CPT | Mod: ,,, | Performed by: PSYCHIATRY & NEUROLOGY

## 2023-01-04 PROCEDURE — 36415 COLL VENOUS BLD VENIPUNCTURE: CPT | Performed by: STUDENT IN AN ORGANIZED HEALTH CARE EDUCATION/TRAINING PROGRAM

## 2023-01-04 PROCEDURE — 63600175 PHARM REV CODE 636 W HCPCS: Performed by: STUDENT IN AN ORGANIZED HEALTH CARE EDUCATION/TRAINING PROGRAM

## 2023-01-04 PROCEDURE — 99233 PR SUBSEQUENT HOSPITAL CARE,LEVL III: ICD-10-PCS | Mod: ,,, | Performed by: PSYCHIATRY & NEUROLOGY

## 2023-01-04 PROCEDURE — 25000003 PHARM REV CODE 250

## 2023-01-04 PROCEDURE — 90833 PR PSYCHOTHERAPY W/PATIENT W/E&M, 30 MIN (ADD ON): ICD-10-PCS | Mod: ,,, | Performed by: PSYCHIATRY & NEUROLOGY

## 2023-01-04 PROCEDURE — 90833 PSYTX W PT W E/M 30 MIN: CPT | Mod: ,,, | Performed by: PSYCHIATRY & NEUROLOGY

## 2023-01-04 PROCEDURE — 80053 COMPREHEN METABOLIC PANEL: CPT | Performed by: STUDENT IN AN ORGANIZED HEALTH CARE EDUCATION/TRAINING PROGRAM

## 2023-01-04 PROCEDURE — 25000003 PHARM REV CODE 250: Performed by: STUDENT IN AN ORGANIZED HEALTH CARE EDUCATION/TRAINING PROGRAM

## 2023-01-04 PROCEDURE — 85025 COMPLETE CBC W/AUTO DIFF WBC: CPT | Performed by: STUDENT IN AN ORGANIZED HEALTH CARE EDUCATION/TRAINING PROGRAM

## 2023-01-04 RX ADMIN — FOLIC ACID 1 MG: 1 TABLET ORAL at 08:01

## 2023-01-04 RX ADMIN — THERA TABS 1 TABLET: TAB at 08:01

## 2023-01-04 RX ADMIN — CYANOCOBALAMIN 1000 MCG: 1000 INJECTION, SOLUTION INTRAMUSCULAR; SUBCUTANEOUS at 08:01

## 2023-01-04 RX ADMIN — DIVALPROEX SODIUM 250 MG: 250 TABLET, DELAYED RELEASE ORAL at 08:01

## 2023-01-04 RX ADMIN — AMLODIPINE BESYLATE 10 MG: 5 TABLET ORAL at 08:01

## 2023-01-04 RX ADMIN — SERTRALINE HYDROCHLORIDE 25 MG: 25 TABLET ORAL at 08:01

## 2023-01-04 RX ADMIN — ATORVASTATIN CALCIUM 40 MG: 40 TABLET, FILM COATED ORAL at 08:01

## 2023-01-04 RX ADMIN — HYDROCHLOROTHIAZIDE 12.5 MG: 12.5 TABLET ORAL at 08:01

## 2023-01-04 RX ADMIN — RISPERIDONE 0.5 MG: 0.5 TABLET, ORALLY DISINTEGRATING ORAL at 08:01

## 2023-01-04 NOTE — PLAN OF CARE
VN note: VN completed AVS and attachments and notified bedside nurseBritt. Will cont to be available and intervene prn.

## 2023-01-04 NOTE — PLAN OF CARE
SW was notified by nurse that pt has dc and went to [facility] in [place]. Per nurse she has notified pts daughter. SW will continue to follow pt throughout his transitions of care and assist with any dc needs.        01/04/23 0944   Final Note   Assessment Type Final Discharge Note   Anticipated Discharge Disposition Psych   Post-Acute Status   Discharge Delays None known at this time

## 2023-01-04 NOTE — PLAN OF CARE
Problem: Adult Inpatient Plan of Care  Goal: Plan of Care Review  Outcome: Ongoing, Not Progressing     Problem: Diabetes Comorbidity  Goal: Blood Glucose Level Within Targeted Range  Outcome: Ongoing, Not Progressing     Problem: Behavior Regulation Impairment (Dementia Signs/Symptoms)  Goal: Improved Behavioral Control (Dementia Signs/Symptoms)  Outcome: Ongoing, Not Progressing     Problem: Mood Impairment (Dementia Signs/Symptoms)  Goal: Improved Mood Symptoms (Dementia Signs/Symptoms)  Outcome: Ongoing, Not Progressing     .Plan of care reviewed with the patient. Scheduled medicines given and the patient tolerated well. Patient refused to take Divalproex 250 mg, reported to . No acute distress reported in the shift. Patient's Accu Check was 218, covered with 1 Unit Insulin Aspart as per the sliding scale.  Advised the patient to call for the assistance. Patient has got PEC and discharged to Geriatric Psych facility. Continued monitoring the patient.
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
## 2023-01-04 NOTE — NURSING
Patient transport here to transfer patient to [facility] in [place]. Daughter Leona notified of transport. Orders and AVS reviewed and given to Jayden.Patient suitcase and belongings packed up and given to transport.
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
